# Patient Record
Sex: FEMALE | Race: BLACK OR AFRICAN AMERICAN | Employment: OTHER | URBAN - METROPOLITAN AREA
[De-identification: names, ages, dates, MRNs, and addresses within clinical notes are randomized per-mention and may not be internally consistent; named-entity substitution may affect disease eponyms.]

---

## 2019-03-12 ENCOUNTER — OFFICE VISIT (OUTPATIENT)
Dept: ENDOCRINOLOGY | Age: 79
End: 2019-03-12

## 2019-03-12 VITALS
TEMPERATURE: 96.5 F | HEART RATE: 61 BPM | HEIGHT: 64 IN | SYSTOLIC BLOOD PRESSURE: 143 MMHG | RESPIRATION RATE: 18 BRPM | OXYGEN SATURATION: 98 % | DIASTOLIC BLOOD PRESSURE: 60 MMHG | WEIGHT: 165.7 LBS | BODY MASS INDEX: 28.29 KG/M2

## 2019-03-12 DIAGNOSIS — K76.0 FATTY LIVER: ICD-10-CM

## 2019-03-12 DIAGNOSIS — I10 ESSENTIAL HYPERTENSION: ICD-10-CM

## 2019-03-12 DIAGNOSIS — G62.9 NEUROPATHY: ICD-10-CM

## 2019-03-12 DIAGNOSIS — E11.65 UNCONTROLLED TYPE 2 DIABETES MELLITUS WITH HYPERGLYCEMIA (HCC): Primary | ICD-10-CM

## 2019-03-12 RX ORDER — GLIPIZIDE 5 MG/1
TABLET ORAL
COMMUNITY
Start: 2019-01-20 | End: 2019-03-12 | Stop reason: ALTCHOICE

## 2019-03-12 RX ORDER — CHOLECALCIFEROL TAB 125 MCG (5000 UNIT) 125 MCG
TAB ORAL DAILY
COMMUNITY

## 2019-03-12 RX ORDER — RANITIDINE 150 MG/1
150 TABLET, FILM COATED ORAL 2 TIMES DAILY
COMMUNITY
End: 2020-06-22

## 2019-03-12 RX ORDER — ATENOLOL 50 MG/1
TABLET ORAL
COMMUNITY
Start: 2019-01-28

## 2019-03-12 RX ORDER — ATORVASTATIN CALCIUM 40 MG/1
TABLET, FILM COATED ORAL DAILY
COMMUNITY
End: 2019-12-18 | Stop reason: DRUGHIGH

## 2019-03-12 RX ORDER — SITAGLIPTIN 100 MG/1
TABLET, FILM COATED ORAL
COMMUNITY
Start: 2018-12-10 | End: 2019-03-12 | Stop reason: SDUPTHER

## 2019-03-12 RX ORDER — CLONAZEPAM 0.5 MG/1
TABLET ORAL
COMMUNITY
Start: 2019-02-27

## 2019-03-12 RX ORDER — AMLODIPINE BESYLATE 5 MG/1
TABLET ORAL
COMMUNITY
Start: 2018-12-10

## 2019-03-12 RX ORDER — ICOSAPENT ETHYL 1000 MG/1
CAPSULE ORAL 2 TIMES DAILY WITH MEALS
COMMUNITY

## 2019-03-12 RX ORDER — ESOMEPRAZOLE MAGNESIUM 40 MG/1
CAPSULE, DELAYED RELEASE ORAL
COMMUNITY
Start: 2018-12-22

## 2019-03-12 RX ORDER — GABAPENTIN 300 MG/1
CAPSULE ORAL
COMMUNITY
Start: 2019-01-20 | End: 2020-06-04

## 2019-03-12 NOTE — PATIENT INSTRUCTIONS
Do not skip meals  Do not eat in between meals    Reduce carbs- pasta, rice, potatoes, bread   Do not drink juices or sodas  Donot eat peanut butter     Do not eat sugar free cookies and cakes   Do not eat peaches, oranges, grapes, fruit medleys and  Raisins       -------------------------------------------------------------------------------------------------------------------------------------------    STOP   Glipizide or glucotrol       Stay on januvia 100 mg a day       ----------------------------------------------------------------------------------------------------    [employer]

## 2019-03-12 NOTE — PROGRESS NOTES
1. Have you been to the ER, urgent care clinic since your last visit? Hospitalized since your last visit? No    2. Have you seen or consulted any other health care providers outside of the [address] [place] [address] since your last visit? Include any pap smears or colon screening. No  Chief Complaint   Patient presents with    New Patient     patient here for [id number]     Learning assessment complete  Abuse Screening Questionnaire 3/12/2019   Do you ever feel afraid of your partner? N   Are you in a relationship with someone who physically or mentally threatens you? N   Is it safe for you to go home?  Y     Wt Readings from Last 3 Encounters:   03/12/19 165 lb 11.2 oz (75.2 kg)     Temp Readings from Last 3 Encounters:   03/12/19 96.5 °F (35.8 °C) (Oral)     BP Readings from Last 3 Encounters:   03/12/19 143/60     Pulse Readings from Last 3 Encounters:   03/12/19 61     A1c=5.8% done on 3/5/19

## 2019-03-12 NOTE — PROGRESS NOTES
HISTORY OF PRESENT ILLNESS  Lucille Thompson is a 66 y.o. female. Patient here for initial visit of Type 2 diabetes mellitus . Referred : by dr. Razia Argueta     H/o diabetes for 10 years     Current A1C is 5.8 % and symptoms/problems include {Symptoms; diabetes:61091}     Current diabetic medications include januvia and glipizide   She could nto tolerate metformin    . Current monitoring regimen: {diabetes monitorin}  Home blood sugar records: {diabetes glucometry results:83668}  Any episodes of hypoglycemia? {yes***/no:45840}    Weight trend: {trend:87934}  Prior visit with dietician: {yes***/no:71417}  Current diet: {diet habits:77517}  Current exercise: {exercise types:36026}    Known diabetic complications: {diabetes complications:1215}  Cardiovascular risk factors: {cv risk factors:510}    Eye exam current (within one year): {yes/no/unknown:74}  ROSENDA: {yes/no/unknown:74}     History reviewed. No pertinent past medical history. History reviewed. No pertinent surgical history. Current Outpatient Medications:  amLODIPine (NORVASC) 5 mg tablet,   atenolol (TENORMIN) 50 mg tablet,   esomeprazole (NEXIUM) 40 mg capsule,   clonazePAM (KLONOPIN) 0.5 mg tablet,   gabapentin (NEURONTIN) 300 mg capsule,   glipiZIDE (GLUCOTROL) 5 mg tablet,   JANUVIA 100 mg tablet,   aspirin (ASPIR-81 PO), Take  by mouth. atorvastatin (LIPITOR) 40 mg tablet, Take  by mouth daily. cholecalciferol, VITAMIN D3, (VITAMIN D3) 5,000 unit tab tablet, Take  by mouth daily. raNITIdine (ZANTAC) 150 mg tablet, Take 150 mg by mouth two (2) times a day. multivit-min/iron/folic/lutein (CENTRUM SILVER WOMEN PO), Take  by mouth. icosapent ethyl (VASCEPA) 1 gram capsule, Take  by mouth two (2) times daily (with meals). No current facility-administered medications for this visit.            ROS    Physical Exam    ASSESSMENT and PLAN  {ASSESSMENT/PLAN:14417}

## 2019-03-18 NOTE — PROGRESS NOTES
HISTORY OF PRESENT ILLNESS  Roxy Fenton is a 66 y.o. female. HPI  Patient here for initial visit of Type 2 diabetes mellitus . Referred : by self/pcp    H/o diabetes for several  years     Current A1C is 5.8 % and symptoms/problems include none     Current diabetic medications include GLIPIZIDE 5 MG ONCE A DAY and januvia     Current monitoring regimen: none  Home blood sugar records: trend: stable  Any episodes of hypoglycemia? yes -     Weight trend: stable  Prior visit with dietician: no  Current diet: \"healthy\" diet  in general  Current exercise: no regular exercise    Known diabetic complications: peripheral neuropathy  Cardiovascular risk factors: dyslipidemia, diabetes mellitus, obesity, hypertension, stress, post-menopausal    Eye exam current (within one year): yes  ROSENDA: no     History reviewed. No pertinent past medical history. History reviewed. No pertinent surgical history. Current Outpatient Medications   Medication Sig    amLODIPine (NORVASC) 5 mg tablet     atenolol (TENORMIN) 50 mg tablet     esomeprazole (NEXIUM) 40 mg capsule     clonazePAM (KLONOPIN) 0.5 mg tablet     gabapentin (NEURONTIN) 300 mg capsule     aspirin (ASPIR-81 PO) Take  by mouth.  atorvastatin (LIPITOR) 40 mg tablet Take  by mouth daily.  cholecalciferol, VITAMIN D3, (VITAMIN D3) 5,000 unit tab tablet Take  by mouth daily.  raNITIdine (ZANTAC) 150 mg tablet Take 150 mg by mouth two (2) times a day.  multivit-min/iron/folic/lutein (CENTRUM SILVER WOMEN PO) Take  by mouth.  icosapent ethyl (VASCEPA) 1 gram capsule Take  by mouth two (2) times daily (with meals).  SITagliptin (JANUVIA) 100 mg tablet Take one tab daily.  glucose blood VI test strips (FREESTYLE TEST) strip Use to check blood sugars twice daily. Dx. Code E11.65     No current facility-administered medications for this visit. Review of Systems   Constitutional: Negative. HENT: Negative. Eyes: Negative. Respiratory: Negative. Cardiovascular: Negative. Gastrointestinal: Negative. Genitourinary: Negative. Musculoskeletal: Negative. Skin: Negative. Neurological: Positive for tingling. Endo/Heme/Allergies: Negative. Psychiatric/Behavioral: Negative. Physical Exam   Constitutional: She is oriented to person, place, and time. She appears well-developed and well-nourished. HENT:   Head: Normocephalic. Eyes: Conjunctivae and EOM are normal. Pupils are equal, round, and reactive to light. Neck: Normal range of motion. Neck supple. Cardiovascular: Normal rate and regular rhythm. Pulmonary/Chest: Effort normal and breath sounds normal.   Abdominal: Soft. Bowel sounds are normal.   Musculoskeletal: Normal range of motion. Neurological: She is alert and oriented to person, place, and time. She has normal reflexes. Skin: Skin is warm and dry. Psychiatric: She has a normal mood and affect. Diabetic foot exam:     Left Foot:   Visual Exam: normal    Pulse DP: 2+ (normal)   Filament test: normal sensation    Vibratory sensation: normal      Right Foot:   Visual Exam: normal    Pulse DP: 2+ (normal)   Filament test: normal sensation    Vibratory sensation: normal    ASSESSMENT and PLAN      1. Type 2 DM uncontRolled : A1C IS 5.8 %     COULD NOT Review the glucose log   After good discussion with pt, felt she does nto need glipizide at all and stopped it   Will do any medication addition based on her log at next visit     I felt she is unsafe on glipizide    She will stay on januvia     Patient is advised to check blood sugars 1-2 times daily by rotation method.   reviewed medications and side effects in detail  lab results and schedule of future lab studies reviewed with patient    specific diabetic recommendations: diabetic diet discussed in detail, written exchange diet given, low cholesterol diet, weight control and daily exercise discussed, home glucose monitoring emphasized, glucose meter dispensed to patient and all medications, side effects and compliance discussed carefully    2. Hypoglycemia :  Educated on treating the hypoglycemia. Stopped glipizide    3. HTN : continue current care. Patient is educated about importance of compliance with anti-hypertensives especially ARB/ACEI    4. Dyslipidemia : continue current meds. Patient is educated about benefits and adverse effects of statins and explained how benefits outweigh risk. 5. use of aspirin to prevent MI and TIA's discussed      6.  PERIPHERAL NEUROPATHY : ON GABAPENTIN         > 50 % of time is spent on counseling   Patient voiced understanding her plan of care

## 2019-06-06 ENCOUNTER — HOSPITAL ENCOUNTER (OUTPATIENT)
Dept: LAB | Age: 79
Discharge: HOME | End: 2019-06-06
Payer: MEDICARE

## 2019-06-06 PROCEDURE — 80053 COMPREHEN METABOLIC PANEL: CPT

## 2019-06-06 PROCEDURE — 83036 HEMOGLOBIN GLYCOSYLATED A1C: CPT

## 2019-06-06 PROCEDURE — 82043 UR ALBUMIN QUANTITATIVE: CPT

## 2019-06-06 PROCEDURE — 36415 COLL VENOUS BLD VENIPUNCTURE: CPT

## 2019-06-06 PROCEDURE — 80061 LIPID PANEL: CPT

## 2019-06-13 ENCOUNTER — OFFICE VISIT (OUTPATIENT)
Dept: ENDOCRINOLOGY | Age: 79
End: 2019-06-13

## 2019-06-13 VITALS
HEIGHT: 64 IN | RESPIRATION RATE: 18 BRPM | OXYGEN SATURATION: 98 % | WEIGHT: 158.1 LBS | BODY MASS INDEX: 26.99 KG/M2 | DIASTOLIC BLOOD PRESSURE: 73 MMHG | TEMPERATURE: 96.8 F | HEART RATE: 62 BPM | SYSTOLIC BLOOD PRESSURE: 134 MMHG

## 2019-06-13 DIAGNOSIS — E11.65 UNCONTROLLED TYPE 2 DIABETES MELLITUS WITH HYPERGLYCEMIA (HCC): Primary | ICD-10-CM

## 2019-06-13 DIAGNOSIS — G62.9 NEUROPATHY: ICD-10-CM

## 2019-06-13 DIAGNOSIS — I10 ESSENTIAL HYPERTENSION: ICD-10-CM

## 2019-06-13 DIAGNOSIS — E78.2 MIXED HYPERLIPIDEMIA: ICD-10-CM

## 2019-06-13 RX ORDER — INSULIN PUMP SYRINGE, 3 ML
EACH MISCELLANEOUS
Qty: 1 KIT | Refills: 0 | Status: SHIPPED | OUTPATIENT
Start: 2019-06-13

## 2019-06-13 RX ORDER — NITROFURANTOIN 25; 75 MG/1; MG/1
CAPSULE ORAL
COMMUNITY
Start: 2019-06-11 | End: 2020-06-04

## 2019-06-13 NOTE — PATIENT INSTRUCTIONS
Do not skip meals Do not eat in between meals Reduce carbs- pasta, rice, potatoes, bread Do not drink juices or sodas Do not eat peanut butter Do not eat sugar free cookies and cakes Do not eat peaches, oranges, grapes, fruit medleys and  Raisins  
 
 
------------------------------------------------------------------------------------------------------------------------------------------- 
 
STOP   Glipizide or glucotrol Stay on januvia 100 mg a day

## 2019-06-13 NOTE — PROGRESS NOTES
HISTORY OF PRESENT ILLNESS  Kostas Shields is a 66 y.o. female. HPI  Patient here for  First f/u after  initial visit of Type 2 diabetes mellitus  From march 2019    Hospitalized for high BP   She has multiple UTIS -seeing urologist   And most recent cult is negative     Pt is on macrobid as a precaution          Old history  :    Referred : by self/pcp    H/o diabetes for several  years     Current A1C is 5.8 % and symptoms/problems include none     Current diabetic medications include GLIPIZIDE 5 MG ONCE A DAY and januvia     Current monitoring regimen: none  Home blood sugar records: trend: stable  Any episodes of hypoglycemia? yes -     Weight trend: stable  Prior visit with dietician: no  Current diet: \"healthy\" diet  in general  Current exercise: no regular exercise    Known diabetic complications: peripheral neuropathy  Cardiovascular risk factors: dyslipidemia, diabetes mellitus, obesity, hypertension, stress, post-menopausal    Eye exam current (within one year): yes  ROSENDA: no     History reviewed. No pertinent past medical history. History reviewed. No pertinent surgical history. Current Outpatient Medications   Medication Sig    nitrofurantoin, macrocrystal-monohydrate, (MACROBID) 100 mg capsule     amLODIPine (NORVASC) 5 mg tablet     atenolol (TENORMIN) 50 mg tablet     esomeprazole (NEXIUM) 40 mg capsule     clonazePAM (KLONOPIN) 0.5 mg tablet     gabapentin (NEURONTIN) 300 mg capsule     aspirin (ASPIR-81 PO) Take  by mouth.  atorvastatin (LIPITOR) 40 mg tablet Take  by mouth daily.  cholecalciferol, VITAMIN D3, (VITAMIN D3) 5,000 unit tab tablet Take  by mouth daily.  raNITIdine (ZANTAC) 150 mg tablet Take 150 mg by mouth two (2) times a day.  multivit-min/iron/folic/lutein (CENTRUM SILVER WOMEN PO) Take  by mouth.  icosapent ethyl (VASCEPA) 1 gram capsule Take  by mouth two (2) times daily (with meals).  SITagliptin (JANUVIA) 100 mg tablet Take one tab daily.     glucose blood VI test strips (FREESTYLE TEST) strip Use to check blood sugars twice daily. Dx. Code E11.65 (Patient not taking: Reported on 6/13/2019)     No current facility-administered medications for this visit. Review of Systems   Constitutional: Negative. HENT: Negative. Eyes: Negative. Respiratory: Negative. Cardiovascular: Negative. Gastrointestinal: Negative. Genitourinary: Negative. Musculoskeletal: Negative. Skin: Negative. Neurological: Positive for tingling. Endo/Heme/Allergies: Negative. Psychiatric/Behavioral: Negative. Physical Exam   Constitutional: She is oriented to person, place, and time. She appears well-developed and well-nourished. HENT:   Head: Normocephalic. Eyes: Pupils are equal, round, and reactive to light. Conjunctivae and EOM are normal.   Neck: Normal range of motion. Neck supple. Cardiovascular: Normal rate and regular rhythm. Pulmonary/Chest: Effort normal and breath sounds normal.   Abdominal: Soft. Bowel sounds are normal.   Musculoskeletal: Normal range of motion. Neurological: She is alert and oriented to person, place, and time. She has normal reflexes. Skin: Skin is warm and dry. Psychiatric: She has a normal mood and affect. Diabetic foot exam:     Left Foot:   Visual Exam: normal    Pulse DP: 2+ (normal)   Filament test: normal sensation    Vibratory sensation: normal      Right Foot:   Visual Exam: normal    Pulse DP: 2+ (normal)   Filament test: normal sensation    Vibratory sensation: normal      ASSESSMENT and PLAN    1.  Type 2 DM uncontRolled : A1C IS  6.3 %     From     June 2019      compared to  5.8 %   From  March 2019       COULD NOT Review the glucose log again    After good discussion with pt, felt she does nto need glipizide at all and stopped it   Will do any medication addition based on her log at next visit   I felt she is unsafe on glipizide also and STOPPED IT     She will stay on januvia Patient is advised to check blood sugars 1-2 times daily by rotation method. reviewed medications and side effects in detail  lab results and schedule of future lab studies reviewed with patient      2. Hypoglycemia :  Educated on treating the hypoglycemia. Stopped glipizide    3. HTN : continue current care. Patient is educated about importance of compliance with anti-hypertensives especially ARB/ACEI    4. Dyslipidemia : continue current meds. Patient is educated about benefits and adverse effects of statins and explained how benefits outweigh risk. 5. use of aspirin to prevent MI and TIA's discussed      6.  PERIPHERAL NEUROPATHY : ON GABAPENTIN         > 50 % of time is spent on counseling   Patient voiced understanding her plan of care

## 2019-06-13 NOTE — PROGRESS NOTES
Venus Mckeon is a 66 y.o. female    Patient does not have her glucose meter, patient has been using a old glucose meter. Patient would like a prescription for glucose meter   Chief Complaint   Patient presents with    Diabetes       1. Have you been to the ER, urgent care clinic since your last visit? Hospitalized since your last visit? No  M  2. Have you seen or consulted any other health care providers outside of the [address] [place] [address] since your last visit? Include any pap smears or colon screening. No      Visit Vitals  /73 (BP 1 Location: Right arm, BP Patient Position: Sitting)   Pulse 62   Temp 96.8 °F (36 °C) (Oral)   Resp 18   Ht 5' 4\" (1.626 m)   Wt 158 lb 1.6 oz (71.7 kg)   SpO2 98%   BMI 27.14 kg/m²       Wt Readings from Last 3 Encounters:   06/13/19 158 lb 1.6 oz (71.7 kg)   03/12/19 165 lb 11.2 oz (75.2 kg)     Temp Readings from Last 3 Encounters:   06/13/19 96.8 °F (36 °C) (Oral)   03/12/19 96.5 °F (35.8 °C) (Oral)     BP Readings from Last 3 Encounters:   06/13/19 134/73   03/12/19 143/60     Pulse Readings from Last 3 Encounters:   06/13/19 62   03/12/19 61           Health Maintenance Due   Topic Date Due    EYE EXAM RETINAL OR DILATED  10/20/1950    DTaP/Tdap/Td series (1 - Tdap) 10/20/1961    Shingrix Vaccine Age 50> (1 of 2) 10/20/1990    GLAUCOMA SCREENING Q2Y  10/20/2005    Bone Densitometry (Dexa) Screening  10/20/2005    Pneumococcal 65+ years (1 of 2 - PCV13) 10/20/2005    MEDICARE YEARLY EXAM  03/14/2018         Medication Reconciliation completed, changes noted.   Please  Update medication list.

## 2019-06-13 NOTE — LETTER
6/16/19 Patient: Rachele Mcgarry YOB: 1940 Date of Visit: 6/13/2019 Veronica Major MD 
[address] [address] VIA Facsimile: [phone] Dear Veronica Major MD, Thank you for referring Ms. Rachele Mcgarry to [address] [place] [address] for evaluation. My notes for this consultation are attached. If you have questions, please do not hesitate to call me. I look forward to following your patient along with you. Sincerely, Seymour Nava MD

## 2019-09-12 ENCOUNTER — HOSPITAL ENCOUNTER (OUTPATIENT)
Dept: LAB | Age: 79
Discharge: HOME | End: 2019-09-12
Payer: MEDICARE

## 2019-09-12 DIAGNOSIS — E11.65 UNCONTROLLED TYPE 2 DIABETES MELLITUS WITH HYPERGLYCEMIA (HCC): ICD-10-CM

## 2019-09-12 DIAGNOSIS — E78.2 MIXED HYPERLIPIDEMIA: ICD-10-CM

## 2019-09-12 PROCEDURE — 80061 LIPID PANEL: CPT

## 2019-09-12 PROCEDURE — 36415 COLL VENOUS BLD VENIPUNCTURE: CPT

## 2019-09-13 LAB
CHOLEST SERPL-MCNC: 199 MG/DL (ref 100–199)
HDLC SERPL-MCNC: 40 MG/DL
INTERPRETATION, 910389: NORMAL
LDLC SERPL CALC-MCNC: 117 MG/DL (ref 0–99)
TRIGL SERPL-MCNC: 209 MG/DL (ref 0–149)
VLDLC SERPL CALC-MCNC: 42 MG/DL (ref 5–40)

## 2019-12-09 ENCOUNTER — LAB ONLY (OUTPATIENT)
Dept: ENDOCRINOLOGY | Age: 79
End: 2019-12-09

## 2019-12-09 ENCOUNTER — HOSPITAL ENCOUNTER (OUTPATIENT)
Dept: LAB | Age: 79
Discharge: HOME | End: 2019-12-09

## 2019-12-09 DIAGNOSIS — E11.65 UNCONTROLLED TYPE 2 DIABETES MELLITUS WITH HYPERGLYCEMIA (HCC): Primary | ICD-10-CM

## 2019-12-09 DIAGNOSIS — E11.65 UNCONTROLLED TYPE 2 DIABETES MELLITUS WITH HYPERGLYCEMIA (HCC): ICD-10-CM

## 2019-12-09 DIAGNOSIS — E78.2 MIXED HYPERLIPIDEMIA: ICD-10-CM

## 2019-12-09 DIAGNOSIS — I10 ESSENTIAL HYPERTENSION: ICD-10-CM

## 2019-12-09 LAB
CREAT UR-MCNC: 96 MG/DL
EST. AVERAGE GLUCOSE BLD GHB EST-MCNC: 128 MG/DL
HBA1C MFR BLD: 6.1 % (ref 4–5.6)
MICROALBUMIN UR-MCNC: 6.59 MG/DL
MICROALBUMIN/CREAT UR-RTO: 69 MG/G (ref 0–30)

## 2019-12-10 LAB
ALBUMIN SERPL-MCNC: 4.4 G/DL (ref 3.5–5)
ALBUMIN/GLOB SERPL: 1.3 {RATIO} (ref 1.1–2.2)
ALP SERPL-CCNC: 55 U/L (ref 45–117)
ALT SERPL-CCNC: 31 U/L (ref 12–78)
ANION GAP SERPL CALC-SCNC: 6 MMOL/L (ref 5–15)
AST SERPL-CCNC: 17 U/L (ref 15–37)
BILIRUB SERPL-MCNC: 1.3 MG/DL (ref 0.2–1)
BUN SERPL-MCNC: 14 MG/DL (ref 6–20)
BUN/CREAT SERPL: 17 (ref 12–20)
CALCIUM SERPL-MCNC: 10 MG/DL (ref 8.5–10.1)
CHLORIDE SERPL-SCNC: 103 MMOL/L (ref 97–108)
CHOLEST SERPL-MCNC: 241 MG/DL
CO2 SERPL-SCNC: 30 MMOL/L (ref 21–32)
CREAT SERPL-MCNC: 0.84 MG/DL (ref 0.55–1.02)
GLOBULIN SER CALC-MCNC: 3.3 G/DL (ref 2–4)
GLUCOSE SERPL-MCNC: 119 MG/DL (ref 65–100)
HDLC SERPL-MCNC: 51 MG/DL
HDLC SERPL: 4.7 {RATIO} (ref 0–5)
LDLC SERPL CALC-MCNC: 156 MG/DL (ref 0–100)
LIPID PROFILE,FLP: ABNORMAL
POTASSIUM SERPL-SCNC: 4.6 MMOL/L (ref 3.5–5.1)
PROT SERPL-MCNC: 7.7 G/DL (ref 6.4–8.2)
SODIUM SERPL-SCNC: 139 MMOL/L (ref 136–145)
TRIGL SERPL-MCNC: 170 MG/DL (ref ?–150)
VLDLC SERPL CALC-MCNC: 34 MG/DL

## 2019-12-18 ENCOUNTER — OFFICE VISIT (OUTPATIENT)
Dept: ENDOCRINOLOGY | Age: 79
End: 2019-12-18

## 2019-12-18 VITALS
OXYGEN SATURATION: 95 % | BODY MASS INDEX: 27.02 KG/M2 | WEIGHT: 158.3 LBS | TEMPERATURE: 97.2 F | RESPIRATION RATE: 18 BRPM | HEART RATE: 64 BPM | HEIGHT: 64 IN | SYSTOLIC BLOOD PRESSURE: 137 MMHG | DIASTOLIC BLOOD PRESSURE: 78 MMHG

## 2019-12-18 DIAGNOSIS — E11.65 UNCONTROLLED TYPE 2 DIABETES MELLITUS WITH HYPERGLYCEMIA (HCC): Primary | ICD-10-CM

## 2019-12-18 DIAGNOSIS — G62.9 NEUROPATHY: ICD-10-CM

## 2019-12-18 DIAGNOSIS — E78.2 MIXED HYPERLIPIDEMIA: ICD-10-CM

## 2019-12-18 DIAGNOSIS — I10 ESSENTIAL HYPERTENSION: ICD-10-CM

## 2019-12-18 RX ORDER — ATORVASTATIN CALCIUM 20 MG/1
20 TABLET, FILM COATED ORAL
Qty: 90 TAB | Refills: 4 | Status: SHIPPED | OUTPATIENT
Start: 2019-12-18

## 2019-12-18 NOTE — LETTER
12/21/19 Patient: Corey Bush YOB: 1940 Date of Visit: 12/18/2019 General Leta MD 
[address] [facility] [address] [place] [address] VIA Facsimile: [phone] Dear General Leta MD, Thank you for referring Ms. Corey Buhs to [address] [place] [address] for evaluation. My notes for this consultation are attached. If you have questions, please do not hesitate to call me. I look forward to following your patient along with you. Sincerely, Iker Clinton MD

## 2019-12-18 NOTE — PATIENT INSTRUCTIONS
Do not skip meals Do not eat in between meals Reduce carbs- pasta, rice, potatoes, bread Do not drink juices or sodas Do not eat peanut butter Do not eat sugar free cookies and cakes Do not eat peaches, oranges, grapes, fruit medleys and  Raisins  
 
 
------------------------------------------------------------------------------------------------------------------------------------------- Stay on januvia 100 mg a day RESUME LIPITOR 20 MG AT NIGHT  
 
 
 
------------------------------------------------------------------------------------------------------------------------------

## 2019-12-18 NOTE — PROGRESS NOTES
HISTORY OF PRESENT ILLNESS  Richar Johnson is a 78 y.o. female. HPI  Patient here for  6 month  f/u after  visit of Type 2 diabetes mellitus  From June 2019    No meter   Feels fine   denies any low sugars       Old history  :     Hospitalized for high BP   She has multiple UTIS -seeing urologist   And most recent cult is negative     Pt is on macrobid as a precaution          Old history  :    Referred : by self/pcp    H/o diabetes for several  years     Current A1C is 5.8 % and symptoms/problems include none     Current diabetic medications include GLIPIZIDE 5 MG ONCE A DAY and januvia     Current monitoring regimen: none  Home blood sugar records: trend: stable  Any episodes of hypoglycemia? yes -     Weight trend: stable  Prior visit with dietician: no  Current diet: \"healthy\" diet  in general  Current exercise: no regular exercise    Known diabetic complications: peripheral neuropathy  Cardiovascular risk factors: dyslipidemia, diabetes mellitus, obesity, hypertension, stress, post-menopausal    Eye exam current (within one year): yes  ROSENDA: no     History reviewed. No pertinent past medical history. History reviewed. No pertinent surgical history. Current Outpatient Medications   Medication Sig    glucose blood VI test strips (FREESTYLE LITE STRIPS) strip Use to check blood sugars twice daily. Dx. Code E11.65    Blood-Glucose Meter (FREESTYLE LITE METER) monitoring kit Use to check blood sugars twice daily. Dx. Code E11.65    amLODIPine (NORVASC) 5 mg tablet     atenolol (TENORMIN) 50 mg tablet     esomeprazole (NEXIUM) 40 mg capsule     clonazePAM (KLONOPIN) 0.5 mg tablet     gabapentin (NEURONTIN) 300 mg capsule     aspirin (ASPIR-81 PO) Take  by mouth.  atorvastatin (LIPITOR) 40 mg tablet Take  by mouth daily.  cholecalciferol, VITAMIN D3, (VITAMIN D3) 5,000 unit tab tablet Take  by mouth daily.  raNITIdine (ZANTAC) 150 mg tablet Take 150 mg by mouth two (2) times a day.     multivit-min/iron/folic/lutein (CENTRUM SILVER WOMEN PO) Take  by mouth.  icosapent ethyl (VASCEPA) 1 gram capsule Take  by mouth two (2) times daily (with meals).  SITagliptin (JANUVIA) 100 mg tablet Take one tab daily.  nitrofurantoin, macrocrystal-monohydrate, (MACROBID) 100 mg capsule     glucose blood VI test strips (FREESTYLE TEST) strip Use to check blood sugars twice daily. Dx. Code E11.65 (Patient not taking: Reported on 6/13/2019)     No current facility-administered medications for this visit. Review of Systems   Constitutional: Negative. HENT: Negative. Eyes: Negative. Respiratory: Negative. Cardiovascular: Negative. Gastrointestinal: Negative. Genitourinary: Negative. Musculoskeletal: Negative. Skin: Negative. Neurological: Positive for tingling. Endo/Heme/Allergies: Negative. Psychiatric/Behavioral: Negative. Physical Exam   Constitutional: She is oriented to person, place, and time. She appears well-developed and well-nourished. HENT:   Head: Normocephalic. Eyes: Pupils are equal, round, and reactive to light. Conjunctivae and EOM are normal.   Neck: Normal range of motion. Neck supple. Cardiovascular: Normal rate and regular rhythm. Pulmonary/Chest: Effort normal and breath sounds normal.   Abdominal: Soft. Bowel sounds are normal.   Musculoskeletal: Normal range of motion. Neurological: She is alert and oriented to person, place, and time. She has normal reflexes. Skin: Skin is warm and dry. Psychiatric: She has a normal mood and affect.      Diabetic foot exam:     Left Foot:   Visual Exam: normal    Pulse DP: 2+ (normal)   Filament test: normal sensation    Vibratory sensation: normal      Right Foot:   Visual Exam: normal    Pulse DP: 2+ (normal)   Filament test: normal sensation    Vibratory sensation: normal        Lab Results   Component Value Date/Time    Hemoglobin A1c 6.1 (H) 12/09/2019 09:42 AM    Hemoglobin A1c 6.3 (H) 06/06/2019 08:52 AM    Hemoglobin A1c, External 5.8 03/01/2019    Glucose 119 (H) 12/09/2019 09:42 AM    Microalbumin/Creat ratio (mg/g creat) 69 (H) 12/09/2019 09:40 AM    Microalbumin,urine random 6.59 12/09/2019 09:40 AM    LDL, calculated 156 (H) 12/09/2019 09:42 AM    Creatinine 0.84 12/09/2019 09:42 AM      Lab Results   Component Value Date/Time    Cholesterol, total 241 (H) 12/09/2019 09:42 AM    HDL Cholesterol 51 12/09/2019 09:42 AM    LDL, calculated 156 (H) 12/09/2019 09:42 AM    LDL-C, External 102 03/01/2019    Triglyceride 170 (H) 12/09/2019 09:42 AM    CHOL/HDL Ratio 4.7 12/09/2019 09:42 AM     Lab Results   Component Value Date/Time    ALT (SGPT) 31 12/09/2019 09:42 AM    AST (SGOT) 17 12/09/2019 09:42 AM    Alk. phosphatase 55 12/09/2019 09:42 AM    Bilirubin, total 1.3 (H) 12/09/2019 09:42 AM    Albumin 4.4 12/09/2019 09:42 AM    Protein, total 7.7 12/09/2019 09:42 AM     Lab Results   Component Value Date/Time    GFR est non-AA >60 12/09/2019 09:42 AM    GFR est AA >60 12/09/2019 09:42 AM    Creatinine 0.84 12/09/2019 09:42 AM    BUN 14 12/09/2019 09:42 AM    Sodium 139 12/09/2019 09:42 AM    Potassium 4.6 12/09/2019 09:42 AM    Chloride 103 12/09/2019 09:42 AM    CO2 30 12/09/2019 09:42 AM           ASSESSMENT and PLAN    1. Type 2 DM uncontRolled : A1C IS   6.1 %      From dec 2019   Compared to    6.3 %     From     June 2019      compared to  5.8 %   From  March 2019       COULD NOT Review the glucose log again - third time     After good discussion with pt, felt she does nto need glipizide at all and stopped it   Will do any medication addition based on her log at next visit   I felt she is unsafe on glipizide also and STOPPED IT     She will stay on januvia ONLY     Patient is advised to check blood sugars 1-2 times daily by rotation method. reviewed medications and side effects in detail  lab results and schedule of future lab studies reviewed with patient      2.  Hypoglycemia : Educated on treating the hypoglycemia. Stopped glipizide    3. HTN : CONTROLLED, continue ATENOLOL, AND AMLODIPINE  5 MG  Patient is educated about importance of compliance with anti-hypertensives especially ARB/ACEI    4. Dyslipidemia : LDL IS OVER 150, NOT TAKING  STATIN   . Patient is educated about benefits and adverse effects of statins and explained how benefits outweigh risk. 5. use of aspirin to prevent MI and TIA's discussed      6.  PERIPHERAL NEUROPATHY : ON GABAPENTIN         > 50 % of time is spent on counseling   Patient voiced understanding her plan of care

## 2019-12-18 NOTE — PROGRESS NOTES
1. Have you been to the ER, urgent care clinic since your last visit? No  Hospitalized since your last visit? No    2. Have you seen or consulted any other health care providers outside of the [address] [place] [address] since your last visit? Include any pap smears or colon screening. No    Wt Readings from Last 3 Encounters:   12/18/19 158 lb 4.8 oz (71.8 kg)   06/13/19 158 lb 1.6 oz (71.7 kg)   03/12/19 165 lb 11.2 oz (75.2 kg)     Temp Readings from Last 3 Encounters:   12/18/19 97.2 °F (36.2 °C) (Oral)   06/13/19 96.8 °F (36 °C) (Oral)   03/12/19 96.5 °F (35.8 °C) (Oral)     BP Readings from Last 3 Encounters:   12/18/19 137/78   06/13/19 134/73   03/12/19 143/60     Pulse Readings from Last 3 Encounters:   12/18/19 64   06/13/19 62   03/12/19 61     Lab Results   Component Value Date/Time    Hemoglobin A1c 6.1 (H) 12/09/2019 09:42 AM    Hemoglobin A1c, External 5.8 03/01/2019     Patient does not have meter today.

## 2020-06-04 ENCOUNTER — VIRTUAL VISIT (OUTPATIENT)
Dept: NEUROLOGY | Age: 80
End: 2020-06-04

## 2020-06-04 ENCOUNTER — TELEPHONE (OUTPATIENT)
Dept: NEUROLOGY | Age: 80
End: 2020-06-04

## 2020-06-04 DIAGNOSIS — R51.9 NEW ONSET OF HEADACHES AFTER AGE 50: Primary | ICD-10-CM
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
## 2020-06-04 RX ORDER — GABAPENTIN 300 MG/1
300 CAPSULE ORAL 2 TIMES DAILY
Qty: 180 CAP | Refills: 1 | Status: SHIPPED | OUTPATIENT
Start: 2020-06-04 | End: 2021-08-31 | Stop reason: ALTCHOICE

## 2020-06-04 NOTE — TELEPHONE ENCOUNTER
Called and left a message for patient. Her Gabapentin rx printed out because she goes to [facility]. She has to have the written rx to take with her to [facility]. Also, Dr. Barbara Emmanuel wants her to schedule some appts.

## 2020-06-04 NOTE — PROGRESS NOTES
Chief Complaint   Patient presents with    New Patient     virtual visit--new patient c/o shocking pain in head. Also has noticed hand writing is different--no tremors.

## 2020-06-04 NOTE — PROGRESS NOTES
[address] [facility] [address]   [place] [address] [address]    XY   [phone] Fax        Ciara Beasley is a 78 y.o. female who was seen by synchronous (real-time) audio-video technology on 6/4/2020. Consent:  She and/or her healthcare decision maker is aware that this patient-initiated Telehealth encounter is a billable service, with coverage as determined by her insurance carrier. She is aware that she may receive a bill and has provided verbal consent to proceed: Yes    I was in the office while conducting this encounter. Subjective:   Ciara Beasley was seen for New Patient (virtual visit--new patient c/o shocking pain in head. Also has noticed hand writing is different--no tremors.)    26-year-old lady who is seen today accompanied by her daughter for evaluation of what they call head pain. The patient was diagnosed in 2010 with occipital neuralgia. She saw a neurologist but had no treatment for such. She never had any injections. She never had any medication. In any regard it would come and go over the years. In April 2019 she took a fall striking her head. Since that time she has had increasing headaches. Notes that she gets sharp shooting pain in the mid occipital region right sided. It will radiate forward towards her face at times. She has no focal deficits with it. Not lost consciousness. No loss of vision. Her daughter says that she just does not seem right since that trauma. She has been to her dentist to make sure that her teeth were fine and that she did not fracture her jaw when she fell. She has had a head CT initially after that fall but no follow-up studies. No palpitations. No chest pain. No fever. No chills. No focal weakness. No difficulty with speech language or swallowing.   When she gets these pains they are unremitting her daughter says that she just sequestered herself in her bedroom and sleeps or tries to sleep and tries to rest.  No other associated symptom. Her daughter again just says that she does not seem to be herself since the fall. She is more subdued. Her personality is a bit different. No past medical history on file. 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 Prior to Admission medications    Medication Sig Start Date End Date Taking? Authorizing Provider   SITagliptin (JANUVIA) 100 mg tablet Take one tab daily. 12/18/19  Yes Marvin Guajardo MD   atorvastatin (LIPITOR) 20 mg tablet Take 1 Tab by mouth nightly. 12/18/19  Yes Marvin Guajardo MD   glucose blood VI test strips (FREESTYLE LITE STRIPS) strip Use to check blood sugars twice daily. Dx. Code E11.65 6/13/19  Yes Marvin Guajardo MD   Blood-Glucose Meter (FREESTYLE LITE METER) monitoring kit Use to check blood sugars twice daily. Dx. Code E11.65 6/13/19  Yes Marvin Guajardo MD   amLODIPine (NORVASC) 5 mg tablet  12/10/18  Yes Provider, Historical   atenolol (TENORMIN) 50 mg tablet  1/28/19  Yes Provider, Historical   esomeprazole (NEXIUM) 40 mg capsule  12/22/18  Yes Provider, Historical   clonazePAM (KLONOPIN) 0.5 mg tablet  2/27/19  Yes Provider, Historical   gabapentin (NEURONTIN) 300 mg capsule  1/20/19  Yes Provider, Historical   aspirin (ASPIR-81 PO) Take  by mouth. Yes Provider, Historical   cholecalciferol, VITAMIN D3, (VITAMIN D3) 5,000 unit tab tablet Take  by mouth daily. Yes Provider, Historical   raNITIdine (ZANTAC) 150 mg tablet Take 150 mg by mouth two (2) times a day. Yes Provider, Historical   multivit-min/iron/folic/lutein (CENTRUM SILVER WOMEN PO) Take  by mouth. Yes Provider, Historical   icosapent ethyl (VASCEPA) 1 gram capsule Take  by mouth two (2) times daily (with meals). Yes Provider, Historical   glucose blood VI test strips (FREESTYLE TEST) strip Use to check blood sugars twice daily.  Dx. Code E11.65 3/12/19  Yes Marvin Guajardo MD   nitrofurantoin, macrocrystal-monohydrate, (MACROBID) 100 mg capsule  6/11/19   Provider, Historical     Allergies   Allergen Reactions    Darvon [Propoxyphene] Anaphylaxis    Nitrofurantoin Palpitations    Pantoprazole Hives and Shortness of Breath    Septocaine [Articaine-Epinephrine] Palpitations     Social History     Socioeconomic History    Marital status: UNKNOWN     Spouse name: Not on file    Number of children: Not on file    Years of education: Not on file    Highest education level: Not on file   Occupational History    Not on file   Social Needs    Financial resource strain: Not on file    Food insecurity     Worry: Not on file     Inability: Not on file    Transportation needs     Medical: Not on file     Non-medical: Not on file   Tobacco Use    Smoking status: Never Smoker    Smokeless tobacco: Never Used   Substance and Sexual Activity    Alcohol use: Not on file    Drug use: Not on file    Sexual activity: Not on file   Lifestyle    Physical activity     Days per week: Not on file     Minutes per session: Not on file    Stress: Not on file   Relationships    Social connections     Talks on phone: Not on file     Gets together: Not on file     Attends Sabianist service: Not on file     Active member of club or organization: Not on file     Attends meetings of clubs or organizations: Not on file     Relationship status: Not on file    Intimate partner violence     Fear of current or ex partner: Not on file     Emotionally abused: Not on file     Physically abused: Not on file     Forced sexual activity: Not on file   Other Topics Concern    Not on file   Social History Narrative    Not on file   No family history on file. ROS  Pertinent positives and negatives as noted with remainder of comprehensive review negative    Examination  She is a very pleasant lady. She is engaging and interactive with appropriate dress grooming and affect. No scleral icterus.   Oropharynx clear moist.  She has normal speech and language. Normal cognition in terms of discussing her medical history current events following commands. She has full versions. No nystagmus. Symmetric face. Tongue and palate are midline. Shoulder shrug symmetrical.  No pronation. No drift. No abnormal movement. She moves all extremities symmetrically. No ataxia. Due to this being a TeleHealth evaluation, many elements of the physical examination are unable to be assessed. Impression/Plan  68-year-old lady with history of right-sided occipital neuralgia now with increasing head pain status post a fall right about a year ago and concerns as noted. Given that we will proceed with an MRI of the brain as well as carotid Doppler and EEG. We will increase her Neurontin to a dose of 300 mg twice daily. The [address] will not accept controlled substance prescription so I will have the office call that in. I will see her back after her studies have been completed    Pursuant to the emergency declaration under the [facility], [facility]5 waiver authority and the [employer] Act, this Virtual  Visit was conducted, with patient's consent, to reduce the patient's risk of exposure to COVID-19 and provide continuity of care for an established patient. Services were provided through a video synchronous discussion virtually to substitute for in-person clinic visit. Janell Heimlich, MD     This document was created with the assistance of voice recognition software and therefore unintended syntax errors may be present despite editing. For any questions please contact me directly. This note will not be viewable in [address]

## 2020-06-08 ENCOUNTER — TELEPHONE (OUTPATIENT)
Dept: NEUROLOGY | Age: 80
End: 2020-06-08

## 2020-06-08 NOTE — TELEPHONE ENCOUNTER
appt scheduled for eeg on 6/16/20 at 3pm and f/u with Dr. Azul Briseno on 6/22/20 at 9:40 am.  Pt will p/u gabapentin rx that was printed when she comes for EEG

## 2020-06-08 NOTE — TELEPHONE ENCOUNTER
----- Message from Orquidea Askew sent at 6/8/2020  8:03 AM EDT -----  Regarding: /Telephone  General Message/Vendor Calls    Caller's first and last name: Rocio Del Rio      Reason for call:MRI and prescribed medication       Callback required yes/no and why:yes      Best contact number(s):[phone]      Details to clarify the request:Pt daughter called requesting a call back from April in regards to scheduling MRI and in reference to prescribed medication pt cannot get filled .       Orquidea Askew

## 2020-06-10 ENCOUNTER — HOSPITAL ENCOUNTER (OUTPATIENT)
Dept: MRI IMAGING | Age: 80
Discharge: HOME | End: 2020-06-10
Attending: PSYCHIATRY & NEUROLOGY
Payer: MEDICARE

## 2020-06-10 ENCOUNTER — HOSPITAL ENCOUNTER (OUTPATIENT)
Dept: VASCULAR SURGERY | Age: 80
Discharge: HOME | End: 2020-06-10
Attending: PSYCHIATRY & NEUROLOGY
Payer: MEDICARE
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
## 2020-06-10 DIAGNOSIS — R51.9 NEW ONSET OF HEADACHES AFTER AGE 50: ICD-10-CM

## 2020-06-10 PROCEDURE — 93880 EXTRACRANIAL BILAT STUDY: CPT

## 2020-06-10 PROCEDURE — 70551 MRI BRAIN STEM W/O DYE: CPT

## 2020-06-11 LAB
LEFT CCA DIST DIAS: 18.6 CM/S
LEFT CCA DIST SYS: 85.6 CM/S
LEFT CCA PROX DIAS: 10.9 CM/S
LEFT CCA PROX SYS: 56 CM/S
LEFT ECA DIAS: 9.53 CM/S
LEFT ECA SYS: 111.3 CM/S
LEFT ICA DIST DIAS: 28.3 CM/S
LEFT ICA DIST SYS: 90.5 CM/S
LEFT ICA MID DIAS: 16.5 CM/S
LEFT ICA MID SYS: 64.5 CM/S
LEFT ICA PROX DIAS: 16 CM/S
LEFT ICA PROX SYS: 75.8 CM/S
LEFT ICA/CCA SYS: 1.06
LEFT SUBCLAVIAN DIAS: 0 CM/S
LEFT SUBCLAVIAN SYS: 158.5 CM/S
LEFT VERTEBRAL DIAS: 12.03 CM/S
LEFT VERTEBRAL SYS: 39.1 CM/S
RIGHT CCA DIST DIAS: 17.6 CM/S
RIGHT CCA DIST SYS: 86.2 CM/S
RIGHT CCA PROX DIAS: 14.1 CM/S
RIGHT CCA PROX SYS: 80.1 CM/S
RIGHT ECA DIAS: 8.54 CM/S
RIGHT ECA SYS: 83.6 CM/S
RIGHT ICA DIST DIAS: 24.4 CM/S
RIGHT ICA DIST SYS: 85.2 CM/S
RIGHT ICA MID DIAS: 18 CM/S
RIGHT ICA MID SYS: 67.1 CM/S
RIGHT ICA PROX DIAS: 11.5 CM/S
RIGHT ICA PROX SYS: 68.4 CM/S
RIGHT ICA/CCA SYS: 1
RIGHT SUBCLAVIAN DIAS: 0 CM/S
RIGHT SUBCLAVIAN SYS: 112.2 CM/S
RIGHT VERTEBRAL DIAS: 10.38 CM/S
RIGHT VERTEBRAL SYS: 43.5 CM/S

## 2020-06-16 ENCOUNTER — OFFICE VISIT (OUTPATIENT)
Dept: NEUROLOGY | Age: 80
End: 2020-06-16

## 2020-06-16 VITALS — TEMPERATURE: 95.9 F

## 2020-06-16 DIAGNOSIS — R51.9 NEW ONSET OF HEADACHES AFTER AGE 50: Primary | ICD-10-CM

## 2020-06-16 NOTE — PROCEDURES
EEG:      Date:  06/16/20    Requesting Physician:  Lilia Mehta. MD Julito    An EEG is requested in this 68-year-old with head trauma to evaluate for epileptiform abnormality. Medications:  Medications are listed as Neurontin, Klonopin, Tenormin, Norvasc, Lipitor and Januvia. This tracing is obtained during the awake state. During wakefulness there are intermittent runs of posteriorly-dominant and symmetric low-to-medium amplitude 10 cycle per second activities which attenuate with eye opening. Lower-voltage faster-frequency activities are seen symmetrically over the anterior head regions. Hyperventilation is not performed secondary to COVID-19 precautions. Intermittent photic stimulation little alters the tracing. Sleep is not attained. Interpretation:   This EEG recorded during the awake state is normal.

## 2020-06-22 ENCOUNTER — OFFICE VISIT (OUTPATIENT)
Dept: NEUROLOGY | Age: 80
End: 2020-06-22

## 2020-06-22 VITALS
DIASTOLIC BLOOD PRESSURE: 76 MMHG | HEIGHT: 64 IN | WEIGHT: 158.29 LBS | BODY MASS INDEX: 27.02 KG/M2 | RESPIRATION RATE: 18 BRPM | OXYGEN SATURATION: 96 % | TEMPERATURE: 97.3 F | HEART RATE: 74 BPM | SYSTOLIC BLOOD PRESSURE: 140 MMHG

## 2020-06-22 DIAGNOSIS — M54.81 OCCIPITAL NEURALGIA OF RIGHT SIDE: Primary | ICD-10-CM

## 2020-06-22 NOTE — LETTER
6/22/20 Patient: Josselyn Felix YOB: 1940 Date of Visit: 6/22/2020 Garciela Batrh MD 
[address] [address] VIA Facsimile: [phone] Dear Graciela Barth MD, Thank you for referring Ms. Josselyn Felix to [facility] for evaluation. My notes for this consultation are attached. If you have questions, please do not hesitate to call me. I look forward to following your patient along with you. Sincerely, Janet Fu MD

## 2020-06-22 NOTE — PROGRESS NOTES
Chief Complaint   Patient presents with    Results     mri, dop, eeg    Headache     still has HAs but \"not so much\"  approx 10 per mo

## 2020-06-22 NOTE — PROGRESS NOTES
[facility] and [address] at [facility] at [address] [facility], [address] [facility] [address] [facility], [address] [place] [address]   [phone]              Chief Complaint   Patient presents with    Results     mri, dop, eeg    Headache     still has HAs but \"not so much\"  approx 10 per mo     Current Outpatient Medications   Medication Sig Dispense Refill    gabapentin (NEURONTIN) 300 mg capsule Take 1 Cap by mouth two (2) times a day. Max Daily Amount: 600 mg. 180 Cap 1    SITagliptin (JANUVIA) 100 mg tablet Take one tab daily. 90 Tab 4    atorvastatin (LIPITOR) 20 mg tablet Take 1 Tab by mouth nightly. 90 Tab 4    glucose blood VI test strips (FREESTYLE LITE STRIPS) strip Use to check blood sugars twice daily. Dx. Code E11.65 100 Strip 11    Blood-Glucose Meter (FREESTYLE LITE METER) monitoring kit Use to check blood sugars twice daily. Dx. Code E11.65 1 Kit 0    amLODIPine (NORVASC) 5 mg tablet       atenolol (TENORMIN) 50 mg tablet       esomeprazole (NEXIUM) 40 mg capsule       clonazePAM (KLONOPIN) 0.5 mg tablet       aspirin (ASPIR-81 PO) Take  by mouth.  cholecalciferol, VITAMIN D3, (VITAMIN D3) 5,000 unit tab tablet Take  by mouth daily.  multivit-min/iron/folic/lutein (CENTRUM SILVER WOMEN PO) Take  by mouth.  icosapent ethyl (VASCEPA) 1 gram capsule Take  by mouth two (2) times daily (with meals).  glucose blood VI test strips (FREESTYLE TEST) strip Use to check blood sugars twice daily.  Dx. Code E11.65 100 Strip 11      Allergies   Allergen Reactions    Darvon [Propoxyphene] Anaphylaxis    Nitrofurantoin Palpitations    Pantoprazole Hives and Shortness of Breath    Septocaine [Articaine-Epinephrine] Palpitations     Social History     Tobacco Use    Smoking status: Never Smoker    Smokeless tobacco: Never Used   Substance Use Topics    Alcohol use: Not on file  Drug use: Not on file     19-year-old woman returns today for follow-up after her initial consultation June 4. This was an initial evaluation for head pain. She was previously diagnosed with occipital neuralgia. She had increasing head pain after a fall. We went ahead and sent her for an MRI carotid Doppler and EEG to exclude any ominous underlying pathology that could be responsible for secondary headache syndrome. We increased her dose of Neurontin from 300 mg once daily up to 300 mg twice daily. Her MRI scan is personally reviewed this morning and shows age-related white matter changes but nothing significant. Carotid Doppler with no significant stenosis. Her EEG was normal.    Today she is accompanied by her daughter. She reports has had some difficulty taking the Neurontin twice daily because it makes her drowsy morning. Otherwise no tolerability issues. She has been taking the 300 mg daily for her peripheral neuropathy secondary to diabetes. She is controlling her sugar. She still gets the head pain probably about 10 days out of the month. She had a little last night and the day before yesterday she had some intense pain. No focal deficits. No palpitations. No fever. No chills. No nausea. No vomiting. No shortness of breath. Review of systems  Pertinent positives and negatives as noted with remainder of comprehensive review negative      Examination  Visit Vitals  /76 (BP 1 Location: Left arm, BP Patient Position: Sitting)   Pulse 74   Temp 97.3 °F (36.3 °C)   Resp 18   Ht 5' 4\" (1.626 m)   Wt 71.8 kg (158 lb 4.6 oz)   SpO2 96%   BMI 27.17 kg/m²     She is a very pleasant lady. She is awake alert oriented and conversant. Speech and language are normal.  She has no scleral icterus. Dress grooming and affect are appropriate. Pulses are symmetrical.  No edema. She has reactive pupils and her disc margins are flat bilaterally. Face symmetric with symmetric facial sensation. Tongue and palate are midline. Shoulder shrug symmetrical.  She is tender to palpation over the occipital notch on the right and not on the left. She has no pronation drift or abnormal movement. Age-related paratonia is present. She gives full resistance in the upper and lower extremities in all muscle groups to direct testing. Reflexes are symmetrical upper and lower extremities bilaterally and no pathologic reflexes are elicited. No ataxia. No sensory deficit. Gait steady. Impression/Plan  66-year-old lady with right-sided occipital neuralgia which has increased after a fall and we discussed this is not uncommon. Some tolerability by taking Neurontin 300 mg twice daily so will use 600 mg nightly. Discussed that today at length. Discussed Neurontin as well as its indications and potential side effects. Discussed that can take 6-8 weeks to get a maximum effect. We therefore will see her back in about 6-8 weeks after she has been taking it consistently. Good questions today regarding occipital neuralgia, etiology, medications used to treat, etc.    Cody Kumar MD    Total time: 25 min   Counseling / coordination time: 15 min   > 50% counseling / coordination?: Yes re: as documented above      This note was created using voice recognition software. Despite editing, there may be syntax errors. This note will not be viewable in [address]

## 2021-08-31 ENCOUNTER — OFFICE VISIT (OUTPATIENT)
Dept: NEUROLOGY | Age: 81
End: 2021-08-31
Payer: MEDICARE

## 2021-08-31 VITALS
SYSTOLIC BLOOD PRESSURE: 106 MMHG | HEART RATE: 73 BPM | DIASTOLIC BLOOD PRESSURE: 76 MMHG | WEIGHT: 157.2 LBS | HEIGHT: 64 IN | BODY MASS INDEX: 26.84 KG/M2 | OXYGEN SATURATION: 95 %

## 2021-08-31 DIAGNOSIS — G62.9 NEUROPATHY: ICD-10-CM

## 2021-08-31 DIAGNOSIS — M54.81 OCCIPITAL NEURALGIA OF RIGHT SIDE: Primary | ICD-10-CM

## 2021-08-31 PROCEDURE — G8432 DEP SCR NOT DOC, RNG: HCPCS | Performed by: NURSE PRACTITIONER

## 2021-08-31 PROCEDURE — 1090F PRES/ABSN URINE INCON ASSESS: CPT | Performed by: NURSE PRACTITIONER

## 2021-08-31 PROCEDURE — G8419 CALC BMI OUT NRM PARAM NOF/U: HCPCS | Performed by: NURSE PRACTITIONER

## 2021-08-31 PROCEDURE — 99214 OFFICE O/P EST MOD 30 MIN: CPT | Performed by: NURSE PRACTITIONER

## 2021-08-31 PROCEDURE — G8536 NO DOC ELDER MAL SCRN: HCPCS | Performed by: NURSE PRACTITIONER

## 2021-08-31 PROCEDURE — 1101F PT FALLS ASSESS-DOCD LE1/YR: CPT | Performed by: NURSE PRACTITIONER

## 2021-08-31 PROCEDURE — G8428 CUR MEDS NOT DOCUMENT: HCPCS | Performed by: NURSE PRACTITIONER

## 2021-08-31 PROCEDURE — G8400 PT W/DXA NO RESULTS DOC: HCPCS | Performed by: NURSE PRACTITIONER

## 2021-08-31 RX ORDER — ROSUVASTATIN CALCIUM 5 MG/1
TABLET, COATED ORAL
COMMUNITY
End: 2021-10-13 | Stop reason: ALTCHOICE

## 2021-08-31 RX ORDER — MELOXICAM 15 MG/1
15 TABLET ORAL DAILY
COMMUNITY

## 2021-08-31 RX ORDER — ACETAMINOPHEN AND CODEINE PHOSPHATE 300; 15 MG/1; MG/1
1 TABLET ORAL
COMMUNITY

## 2021-08-31 RX ORDER — CYCLOBENZAPRINE HCL 10 MG
10 TABLET ORAL
COMMUNITY

## 2021-08-31 RX ORDER — GABAPENTIN 400 MG/1
CAPSULE ORAL
Qty: 30 CAPSULE | Refills: 5 | Status: SHIPPED | OUTPATIENT
Start: 2021-08-31 | End: 2021-10-13

## 2021-08-31 RX ORDER — SUCRALFATE 1 G/1
1 TABLET ORAL
COMMUNITY

## 2021-08-31 RX ORDER — NITROFURANTOIN (MACROCRYSTALS) 100 MG/1
CAPSULE ORAL
COMMUNITY

## 2021-09-01 NOTE — PROGRESS NOTES
Susy oYrk is a [de-identified] y.o. female who presents with the following  Chief Complaint   Patient presents with    Follow-up     neuropathy & occipital neuralgia       HPI     FU neuralgia and right lower extremity numbness, tingling, weakness. Neuralgia is stable on Gapapentin 300 mg nightly. She has noticed the last few months her right leg is getting worse  Was diagnosed with neuropathy when living in [place] with what seems like was an EMG   Over 5 years ago   Has not had one since  States she only had it in her right leg  She feels needles, ants, creepy crawlers in the leg and up to about the knee per report. She is on Gabapentin for her neuralgia but not noticed it has helped the nerve damage. Has been on 300 mg nightly. BID makes her too tired. She does have trouble with balance, coordination and the right leg and motor functioning. No recent falls. Does have lumbar spine pain    Allergies   Allergen Reactions    Darvon [Propoxyphene] Anaphylaxis    Nitrofurantoin Palpitations    Pantoprazole Hives and Shortness of Breath    Septocaine [Articaine-Epinephrine] Palpitations       Current Outpatient Medications   Medication Sig    acetaminophen-codeine (TYLENOL #2) 300-15 mg tab Take 1 Tablet by mouth every four (4) hours as needed.  meloxicam (MOBIC) 15 mg tablet Take 15 mg by mouth daily.  mirabegron ER (Myrbetriq) 50 mg ER tablet Take 25 mg by mouth daily.  nitrofurantoin (MACRODANTIN) 100 mg capsule Take  by mouth nightly.  rosuvastatin (CRESTOR) 5 mg tablet Take  by mouth nightly.  sucralfate (CARAFATE) 1 gram tablet Take 1 g by mouth four (4) times daily as needed.  cyclobenzaprine (FLEXERIL) 10 mg tablet Take 10 mg by mouth two (2) times daily as needed for Muscle Spasm(s).  gabapentin (NEURONTIN) 400 mg capsule Take 1 capsule by mouth nightly.  SITagliptin (JANUVIA) 100 mg tablet Take one tab daily.     atorvastatin (LIPITOR) 20 mg tablet Take 1 Tab by mouth nightly. (Patient taking differently: Take 40 mg by mouth nightly.)    glucose blood VI test strips (FREESTYLE LITE STRIPS) strip Use to check blood sugars twice daily. Dx. Code E11.65    Blood-Glucose Meter (FREESTYLE LITE METER) monitoring kit Use to check blood sugars twice daily. Dx. Code E11.65    amLODIPine (NORVASC) 5 mg tablet     atenolol (TENORMIN) 50 mg tablet     esomeprazole (NEXIUM) 40 mg capsule     clonazePAM (KLONOPIN) 0.5 mg tablet     aspirin (ASPIR-81 PO) Take  by mouth.  cholecalciferol, VITAMIN D3, (VITAMIN D3) 5,000 unit tab tablet Take  by mouth daily.  multivit-min/iron/folic/lutein (CENTRUM SILVER WOMEN PO) Take  by mouth.  icosapent ethyl (VASCEPA) 1 gram capsule Take  by mouth two (2) times daily (with meals).  glucose blood VI test strips (FREESTYLE TEST) strip Use to check blood sugars twice daily. Dx. Code E11.65     No current facility-administered medications for this visit. Social History     Tobacco Use   Smoking Status Never Smoker   Smokeless Tobacco Never Used       No past medical history on file. No past surgical history on file. No family history on file. Social History     Socioeconomic History    Marital status:      Spouse name: Not on file    Number of children: Not on file    Years of education: Not on file    Highest education level: Not on file   Tobacco Use    Smoking status: Never Smoker    Smokeless tobacco: Never Used     Social Determinants of Health     Financial Resource Strain:     Difficulty of Paying Living Expenses:    Food Insecurity:     Worried About Running Out of Food in the Last Year:     [address] in the Last Year:    Transportation Needs:     Lack of Transportation (Medical):      Lack of Transportation (Non-Medical):    Physical Activity:     Days of Exercise per Week:     Minutes of Exercise per Session:    Stress:     Feeling of Stress :    Social Connections:     Frequency of Communication with Friends and Family:     Frequency of Social Gatherings with Friends and Family:     Attends Spiritism Services:     Active Member of Clubs or Organizations:     Attends Club or Organization Meetings:     Marital Status:        Review of Systems   Eyes: Negative for blurred vision. Respiratory: Negative for shortness of breath and wheezing. Musculoskeletal: Positive for back pain. Neurological: Positive for tingling, weakness and headaches. Remainder of comprehensive review is negative. Physical Exam :    Visit Vitals  /76   Pulse 73   Ht 5' 4\" (1.626 m)   Wt 71.3 kg (157 lb 3.2 oz)   SpO2 95%   BMI 26.98 kg/m²       General: Well defined, nourished, and groomed individual in no acute distress.    Neck: Supple, nontender, no bruits, no pain with resistance to active range of motion.    Heart: Regular rate and rhythm, no murmurs, rub, or gallop. Normal S1S2. Lungs: Clear to auscultation bilaterally with equal chest expansion, no cough, no wheeze  Musculoskeletal: Extremities revealed no edema and had full range of motion of joints.    Psych: Good mood and bright affect    NEUROLOGICAL EXAMINATION:    Mental Status: Alert and oriented to person, place, and time    Cranial Nerves:    II, III, IV, VI: Visual acuity grossly intact. Visual fields are normal.    Pupils are equal, round, and reactive to light and accommodation.    Extra-ocular movements are full and fluid. Fundoscopic exam was benign, no ptosis or nystagmus.    V-XII: Hearing is grossly intact. Facial features are symmetric, with normal sensation and strength. The palate rises symmetrically and the tongue protrudes midline. Sternocleidomastoids 5/5. Motor Examination: Normal tone, bulk, and strength, 5/5 muscle strength throughout. Coordination: Finger to nose was normal. No resting or intention tremor    Gait and Station: Steady while walking. Normal arm swing. No pronator drift.  No muscle wasting or fasiculations noted. Reflexes: DTRs 1+ throughout. Neurosensory Exam:  Stocking glove sensory loss to temperature, vibration, and pinprick to the thigh right LE        Results for orders placed or performed during the hospital encounter of 06/10/20   DUPLEX CAROTID BILATERAL   Result Value Ref Range    Right subclavian sys 112.2 cm/s    RIGHT SUBCLAVIAN ARTERY D 0.00 cm/s    Right cca dist sys 86.2 cm/s    Right CCA dist [place] 17.6 cm/s    Right CCA prox sys 80.1 cm/s    Right CCA prox [place] 14.1 cm/s    Right eca sys 83.6 cm/s    RIGHT EXTERNAL CAROTID ARTERY D 8.54 cm/s    Right ICA dist sys 85.2 cm/s    Right ICA dist [place] 24.4 cm/s    Right ICA mid sys 67.1 cm/s    Right ICA mid [place] 18.0 cm/s    Right ICA prox sys 68.4 cm/s    Right ICA prox [place] 11.5 cm/s    Right vertebral sys 43.5 cm/s    RIGHT VERTEBRAL ARTERY D 10.38 cm/s    Right ICA/CCA sys 1.0     Left subclavian sys 158.5 cm/s    LEFT SUBCLAVIAN ARTERY D 0.00 cm/s    Left CCA dist sys 85.6 cm/s    Left CCA dist [place] 18.6 cm/s    Left CCA prox sys 56.0 cm/s    Left CCA prox [place] 10.9 cm/s    Left ECA sys 111.3 cm/s    LEFT EXTERNAL CAROTID ARTERY D 9.53 cm/s    Left ICA dist sys 90.5 cm/s    Left ICA dist [place] 28.3 cm/s    Left ICA mid sys 64.5 cm/s    Left ICA mid [place] 16.5 cm/s    Left ICA prox sys 75.8 cm/s    Left ICA prox [place] 16.0 cm/s    Left vertebral sys 39.1 cm/s    LEFT VERTEBRAL ARTERY D 12.03 cm/s    Left ICA/CCA sys 1.06        Orders Placed This Encounter    EMG LIMITED     Standing Status:   Future     Standing Expiration Date:   2/28/2022     Order Specific Question:   Reason for Exam:     Answer:   RIGHT LEG    acetaminophen-codeine (TYLENOL #2) 300-15 mg tab     Sig: Take 1 Tablet by mouth every four (4) hours as needed.  meloxicam (MOBIC) 15 mg tablet     Sig: Take 15 mg by mouth daily.  mirabegron ER (Myrbetriq) 50 mg ER tablet     Sig: Take 25 mg by mouth daily.     nitrofurantoin (MACRODANTIN) 100 mg capsule     Sig: Take  by mouth nightly.  rosuvastatin (CRESTOR) 5 mg tablet     Sig: Take  by mouth nightly.  sucralfate (CARAFATE) 1 gram tablet     Sig: Take 1 g by mouth four (4) times daily as needed.  cyclobenzaprine (FLEXERIL) 10 mg tablet     Sig: Take 10 mg by mouth two (2) times daily as needed for Muscle Spasm(s).  gabapentin (NEURONTIN) 400 mg capsule     Sig: Take 1 capsule by mouth nightly. Dispense:  30 Capsule     Refill:  5       1. Occipital neuralgia of right side    2. Neuropathy          Increase Gabapentin to 400 mg nightly for neuralgia and neuropathy. EMG RIGHT leg to evaluate further for radiculopathy vs other   States she has no symptoms in the left leg   Fu after.            This note will not be viewable in [employer]

## 2021-10-01 ENCOUNTER — OFFICE VISIT (OUTPATIENT)
Dept: NEUROLOGY | Age: 81
End: 2021-10-01
Payer: MEDICARE

## 2021-10-01 DIAGNOSIS — R20.2 PARESTHESIA: Primary | ICD-10-CM

## 2021-10-01 PROCEDURE — 95910 NRV CNDJ TEST 7-8 STUDIES: CPT | Performed by: PSYCHIATRY & NEUROLOGY

## 2021-10-01 PROCEDURE — 95886 MUSC TEST DONE W/N TEST COMP: CPT | Performed by: PSYCHIATRY & NEUROLOGY

## 2021-10-01 NOTE — PROCEDURES
EMG/ NCS Report  DR[facility]ILITATION  - [facility]  [facility]  [facility], [address] [place], [address]   Ph: [phone]   FAX: [phone]  Test Date:  2019      Test Date:  10/1/2021    Patient: Dinesh Beasley : 1940 Physician: Gregg Quintero MD   Sex: Female Height: ' \" Ref Javier Kruger   ID#: [id number] Weight:  lbs. Technician: Jacquelyn Izaguirre     Patient History / Exam:  Patient comes in with chronic lower back pain and burning pain of the R LE for 10 yrs.    (+) diabetes. Patient is coming for neuropathy evaluation. EMG & NCV Findings:  Evaluation of the right Fibular motor nerve showed normal distal onset latency (3.7 ms), reduced amplitude (0.9 mV), normal conduction velocity (B Fib-Ankle, 42 m/s), and normal conduction velocity (Poplt-B Fib, 59 m/s). The right Fibular TA motor nerve showed normal distal onset latency (3.5 ms), normal amplitude (5.9 mV), and normal conduction velocity (Poplit-Fib Head, 59 m/s). The right tibial motor nerve showed normal distal onset latency (4.5 ms), normal amplitude (6.9 mV), and normal conduction velocity (Knee-Ankle, 42 m/s). The left Sup Fibular sensory nerve showed normal distal peak latency (2.4 ms), normal amplitude (14.4 µV), and normal conduction velocity (Lower leg-Lat ankle, 56 m/s). The right Sup Fibular sensory nerve showed no response (Lower leg). The left sural sensory and the right sural sensory nerves showed normal distal peak latency (L3.9, R4.2 ms) and normal amplitude (L6.8, R5.0 µV). F Wave studies indicate that the right tibial F wave has prolonged latency (59.93 ms). Needle evaluation of the right extensor digitorum brevis and the right anterior tibialis muscles showed diminished recruitment, Incr Duration, and slightly increased polyphasic potentials.   The right posterior tibialis and the right gluteus medius muscles showed diminished recruitment, Incr Duration, and increased motor unit amplitude. All remaining muscles (as indicated in the following table) showed no evidence of electrical instability. Impression:    ABNORMAL    Extensive electrodiagnostic examination of the right lower extremity, with additional nerve conduction study of the left lower extremity shows the followin) Reduced right fibular motor amplitude response recording EDB muscle  2. Chronic, without active, motor axon loss changes in muscles innervated by right L5 root/segment    Above findings are suggestive of an underlying chronic right L5 motor radiculopathy. Recommend radiological correlation. Of note, right superficial fibular sensory response is absent compared to the left, which can reportedly be associated in severe cases of right L5 radiculopathy owing to a more distal compression.           Braeden Mishra MD  Diplomate, American Board of Psychiatry and Neurology  Diplomate, Neuromuscular Medicine  Diplomate, American Board of Electrodiagnostic Medicine  Director, [address] [place] [address] Accredited Laboratory with Exemplary Status          Nerve Conduction Studies  Anti Sensory Summary Table     Stim Site NR Peak (ms) Norm Peak (ms) P-T Amp (µV) Norm P-T Amp Site1 Site2 Dist (cm)   Left Sup Fibular Anti Sensory (Lat ankle)  29.4°C   Lower leg    2.4 <4.6 14.4 >4 Lower leg Lat ankle 10.0   Right Sup Fibular Anti Sensory (Lat ankle)  30.6°C   Lower leg NR  <4.6  >4 Lower leg Lat ankle 10.0   Left Sural Anti Sensory (Lat Mall)  29.4°C   Calf    3.9 <4.5 6.8 >4.0 Calf Lat Mall 14.0   Right Sural Anti Sensory (Lat Mall)  30.6°C   Calf    4.2 <4.5 5.0 >4.0 Calf Lat Mall 14.0     Motor Summary Table     Stim Site NR Onset (ms) Norm Onset (ms) O-P Amp (mV) Norm O-P Amp Amp (Prev) (%) Site1 Site2 Dist (cm) Orlando (m/s) Norm Orlando (m/s)   Right Fibular Motor (Ext Dig Brev)  31.1°C   Ankle    3.7 <6.5 0.9 >1.1 100.0 Ankle Ext Dig Brev 8.0     B Fib    10.6  0.8  88.9 B Fib Ankle 29.0 42 >38   Poplt 12.3  0.8  100.0 Poplt B Fib 10.0 59 >42   Right Fibular TA Motor (Tib Ant)  30.3°C   Fib Head    3.5 <4.5 5.9 >3.0 100.0 Fib Head Tib Ant 10.0     Poplit    5.2  5.6  94.9 Poplit Fib Head 10.0 59 >40   Right Tibial Motor (Abd Arauz Brev)  31.2°C   Ankle    4.5 <6.1 6.9 >1.1 100.0 Ankle Abd Arauz Brev 8.0     Knee    13.1  5.3  76.8 Knee Ankle 36.0 42 >39     F Wave Studies     NR F-Lat (ms) Lat Norm (ms) L-R F-Lat (ms) L-R Lat Norm   Right Tibial (Mrkrs) (Abd Hallucis)  31.2°C      59.93 <56  <5.7     H Reflex Studies     NR H-Lat (ms) L-R H-Lat (ms) L-R Lat Norm   Right Tibial (Gastroc)  31.2°C      40.00  <2.0     EMG     Side Muscle Nerve Root Ins Act Fibs Psw Recrt Duration Amp Poly Comment   Right Ext Dig Brev Dp Br Peron L5, S1 Nml Nml Nml Reduced Incr Nml 1+    Right AbdHallucis MedPlantar S1-2 Nml Nml Nml Nml Nml Nml Nml    Right AntTibialis Dp Br Peron L4-5 Nml Nml Nml Reduced Incr Nml 1+    Right PostTibialis Tibial L5, S1 Nml Nml Nml Reduced Incr Incr Nml    Right MedGastroc Tibial S1-2 Nml Nml Nml Nml Nml Nml Nml    Right VastusLat Femoral L2-4 Nml Nml Nml Nml Nml Nml Nml    Right GluteusMed SupGluteal L4-S1 Nml Nml Nml Reduced Incr Incr Nml    Right Lower Lumb Parasp Rami L5,S1 Nml Nml Nml Nml Nml Nml Nml                Nerve Conduction Studies  Anti Sensory Left/Right Comparison     Stim Site L Lat (ms) R Lat (ms) L-R Lat (ms) L Amp (µV) R Amp (µV) L-R Amp (%) Site1 Site2 L Orlando (m/s) R Orlando (m/s) L-R Orlando (m/s)   Sup Fibular Anti Sensory (Lat ankle)  29.4°C   Lower leg 1.8   14.4   Lower leg Lat ankle 56     Sural Anti Sensory (Lat Mall)  29.4°C   Calf 3.2 3.6 0.4 6.8 5.0 26.5 Calf Lat Mall 44 39 5     Motor Left/Right Comparison     Stim Site L Lat (ms) R Lat (ms) L-R Lat (ms) L Amp (mV) R Amp (mV) L-R Amp (%) Site1 Site2 L Orlando (m/s) R Orlando (m/s) L-R Orlando (m/s)   Fibular Motor (Ext Dig Brev)  31.1°C   Ankle  3.7   0.9  Ankle Ext Dig Brev      B Fib  10.6   0.8  B Fib Ankle  42    Poplt  12.3   0.8  Poplt B Fib  59    Fibular TA Motor (Tib Ant)  30.3°C   Fib Head  3.5   5.9  Fib Head Tib Ant      Poplit  5.2   5.6  Poplit Fib Head  59    Tibial Motor (Abd Arauz Brev)  31.2°C   Ankle  4.5   6.9  Ankle Abd Arauz Brev      Knee  13.1   5.3  Knee Ankle  42          Waveforms:

## 2021-10-05 ENCOUNTER — TELEPHONE (OUTPATIENT)
Dept: NEUROLOGY | Age: 81
End: 2021-10-05

## 2021-10-05 NOTE — TELEPHONE ENCOUNTER
----- Message from Sheree Rios sent at 10/5/2021  9:38 AM EDT -----  Regarding: /telephone  General Message/Vendor Calls    Caller's first and last name: Sherwin Kennedy (pt's daughter)      Reason for call:pt's daughter called to speak with Dr. Salina Reyes required yes/no and why:yes to speak with       Best contact number(s):[phone]        Details to clarify the request:Pt's daughter called to speak with Dr. Dennis Bower, regarding on the pt's condition. Pt's daughter is stating that her mother is very sick, feeling sharp pain on the head of the pt.       Sheree Rios

## 2021-10-06 NOTE — TELEPHONE ENCOUNTER
S/w pt's daughter, she was asking if there was an appt earlier than 10/13 as pt's head pain has been triggered. Notified there is none unfortunately. She will go to ED if pain gets worse.

## 2021-10-13 ENCOUNTER — OFFICE VISIT (OUTPATIENT)
Dept: NEUROLOGY | Age: 81
End: 2021-10-13
Payer: MEDICARE

## 2021-10-13 VITALS
BODY MASS INDEX: 26.8 KG/M2 | WEIGHT: 157 LBS | OXYGEN SATURATION: 98 % | RESPIRATION RATE: 16 BRPM | TEMPERATURE: 97.5 F | HEART RATE: 80 BPM | HEIGHT: 64 IN | SYSTOLIC BLOOD PRESSURE: 126 MMHG | DIASTOLIC BLOOD PRESSURE: 86 MMHG

## 2021-10-13 DIAGNOSIS — M54.41 RIGHT-SIDED LOW BACK PAIN WITH RIGHT-SIDED SCIATICA, UNSPECIFIED CHRONICITY: ICD-10-CM

## 2021-10-13 DIAGNOSIS — R51.9 NEW ONSET OF HEADACHES AFTER AGE 50: Primary | ICD-10-CM

## 2021-10-13 DIAGNOSIS — M54.16 LUMBAR RADICULOPATHY: ICD-10-CM

## 2021-10-13 PROCEDURE — G8510 SCR DEP NEG, NO PLAN REQD: HCPCS | Performed by: PSYCHIATRY & NEUROLOGY

## 2021-10-13 PROCEDURE — G8400 PT W/DXA NO RESULTS DOC: HCPCS | Performed by: PSYCHIATRY & NEUROLOGY

## 2021-10-13 PROCEDURE — 1090F PRES/ABSN URINE INCON ASSESS: CPT | Performed by: PSYCHIATRY & NEUROLOGY

## 2021-10-13 PROCEDURE — 1101F PT FALLS ASSESS-DOCD LE1/YR: CPT | Performed by: PSYCHIATRY & NEUROLOGY

## 2021-10-13 PROCEDURE — G8536 NO DOC ELDER MAL SCRN: HCPCS | Performed by: PSYCHIATRY & NEUROLOGY

## 2021-10-13 PROCEDURE — G8419 CALC BMI OUT NRM PARAM NOF/U: HCPCS | Performed by: PSYCHIATRY & NEUROLOGY

## 2021-10-13 PROCEDURE — G8427 DOCREV CUR MEDS BY ELIG CLIN: HCPCS | Performed by: PSYCHIATRY & NEUROLOGY

## 2021-10-13 PROCEDURE — 99214 OFFICE O/P EST MOD 30 MIN: CPT | Performed by: PSYCHIATRY & NEUROLOGY

## 2021-10-13 RX ORDER — OXCARBAZEPINE 150 MG/1
150 TABLET, FILM COATED ORAL 2 TIMES DAILY
Qty: 60 TABLET | Refills: 3 | Status: SHIPPED | OUTPATIENT
Start: 2021-10-13

## 2021-10-13 NOTE — PROGRESS NOTES
[facility] and [address] at [facility] at [address] [facility], [address] [facility] [address] [facility], [address] [place] [address]   [phone]              No chief complaint on file. Current Outpatient Medications   Medication Sig Dispense Refill    acetaminophen-codeine (TYLENOL #2) 300-15 mg tab Take 1 Tablet by mouth every four (4) hours as needed.  meloxicam (MOBIC) 15 mg tablet Take 15 mg by mouth daily.  mirabegron ER (Myrbetriq) 50 mg ER tablet Take 25 mg by mouth daily.  nitrofurantoin (MACRODANTIN) 100 mg capsule Take  by mouth nightly.  sucralfate (CARAFATE) 1 gram tablet Take 1 g by mouth four (4) times daily as needed.  cyclobenzaprine (FLEXERIL) 10 mg tablet Take 10 mg by mouth two (2) times daily as needed for Muscle Spasm(s).  gabapentin (NEURONTIN) 400 mg capsule Take 1 capsule by mouth nightly. 30 Capsule 5    SITagliptin (JANUVIA) 100 mg tablet Take one tab daily. 90 Tab 4    atorvastatin (LIPITOR) 20 mg tablet Take 1 Tab by mouth nightly. (Patient taking differently: Take 40 mg by mouth nightly.) 90 Tab 4    glucose blood VI test strips (FREESTYLE LITE STRIPS) strip Use to check blood sugars twice daily. Dx. Code E11.65 100 Strip 11    amLODIPine (NORVASC) 5 mg tablet       atenolol (TENORMIN) 50 mg tablet       esomeprazole (NEXIUM) 40 mg capsule       clonazePAM (KLONOPIN) 0.5 mg tablet       aspirin (ASPIR-81 PO) Take  by mouth.  cholecalciferol, VITAMIN D3, (VITAMIN D3) 5,000 unit tab tablet Take  by mouth daily.  multivit-min/iron/folic/lutein (CENTRUM SILVER WOMEN PO) Take  by mouth.  icosapent ethyl (VASCEPA) 1 gram capsule Take  by mouth two (2) times daily (with meals).  glucose blood VI test strips (FREESTYLE TEST) strip Use to check blood sugars twice daily.  Dx. Code E11.65 100 Strip 11    Blood-Glucose Meter (FREESTYLE LITE METER) monitoring kit Use to check blood sugars twice daily. Dx. Code E11.65 1 Kit 0      Allergies   Allergen Reactions    Darvon [Propoxyphene] Anaphylaxis    Nitrofurantoin Palpitations    Pantoprazole Hives and Shortness of Breath    Septocaine [Articaine-Epinephrine] Palpitations     Social History     Tobacco Use    Smoking status: Never Smoker    Smokeless tobacco: Never Used   Substance Use Topics    Alcohol use: Not on file    Drug use: Not on file     20-year-old lady returns today for follow-up. I saw her June 22 for right-sided occipital neuralgia and she was on Neurontin. She then saw our nurse practitioner August 31 and noted that she was having right lower extremity numbness tingling and weakness that was worsening. He increase gabapentin to a dose of 400 mg nightly and sent her for EMG  EMG was done by one of our neuromuscular experts and this demonstrated chronic L5 motor radiculopathy but no evidence of peripheral neuropathy. Some questions regarding the Neurontin dose and it looks like through the medication dispense report she has been using the 300 mg strength. That was filled for 90 days August 10. She is here with her daughter. She had some difficulty with the pharmacy as above. The pharmacist apparently would not fill the 400 mg prescription. She has been using 600 mg of her 300 mg capsules. No change to her pain. She notes the pain in her head is different than what she had for occipital neuralgia. This is right sided more parietal constant intense and unremitting. No focal deficits. Still pain in the right lower extremity radicular nature and she does have low back pain. Examination  Visit Vitals  /86 (BP 1 Location: Left upper arm, BP Patient Position: Sitting, BP Cuff Size: Adult)   Pulse 80   Temp 97.5 °F (36.4 °C)   Resp 16   Ht 5' 4\" (1.626 m)   Wt 71.2 kg (157 lb)   SpO2 98%   BMI 26.95 kg/m²     Awake alert oriented and conversant.   Normal speech and language. Intact cranial nerves. Full motor strength. Reflexes symmetrical.  Tender to palpation about the right parietal cranium    Impression/Plan  New onset headache in an 66-year-old lady  MRI of the brain  Therapeutic trial of Trileptal 150 mg twice daily    Low back pain with associated right leg pain and abnormal EMG  MRI lumbar spine    Follow-up after tests    Rainer Alas MD        This note was created using voice recognition software. Despite editing, there may be syntax errors.

## 2021-10-21 ENCOUNTER — HOSPITAL ENCOUNTER (OUTPATIENT)
Dept: MRI IMAGING | Age: 81
Discharge: HOME | End: 2021-10-21
Attending: PSYCHIATRY & NEUROLOGY
Payer: MEDICARE

## 2021-10-21 DIAGNOSIS — M54.41 RIGHT-SIDED LOW BACK PAIN WITH RIGHT-SIDED SCIATICA, UNSPECIFIED CHRONICITY: ICD-10-CM

## 2021-10-21 DIAGNOSIS — M54.16 LUMBAR RADICULOPATHY: ICD-10-CM

## 2021-10-21 DIAGNOSIS — R51.9 NEW ONSET OF HEADACHES AFTER AGE 50: ICD-10-CM

## 2021-10-21 PROCEDURE — 72148 MRI LUMBAR SPINE W/O DYE: CPT

## 2021-10-21 PROCEDURE — 70551 MRI BRAIN STEM W/O DYE: CPT

## 2021-10-26 ENCOUNTER — TELEPHONE (OUTPATIENT)
Dept: NEUROLOGY | Age: 81
End: 2021-10-26

## 2021-10-26 DIAGNOSIS — M54.41 RIGHT-SIDED LOW BACK PAIN WITH RIGHT-SIDED SCIATICA, UNSPECIFIED CHRONICITY: ICD-10-CM

## 2021-10-26 DIAGNOSIS — M54.16 LUMBAR RADICULOPATHY: Primary | ICD-10-CM

## 2021-10-26 NOTE — TELEPHONE ENCOUNTER
----- Message from Junius Mariano III sent at 10/26/2021 12:25 PM EDT -----  Regarding: /telephone  General Message/Vendor Calls    Caller's first and last name: Self       Reason for call: follow up MRI      Callback required yes/no and why: yes to confirm       Best contact number(s):[phone]      Details to clarify the request: Pt will like speak to Dr Ysabel Bynum About her  MRI results and if possible schedule a appointment       Junius Mariano III

## 2021-10-27 NOTE — TELEPHONE ENCOUNTER
----- Message from Brent Roy sent at 10/27/2021  2:56 PM EDT -----  Regarding: /telephone  General Message/Vendor Calls    Caller's first and last name: Yasmine Carpenter( pt's daughter)      Reason for call:pt's conditions       Callback required yes/no and why: yes to speak with Yemi Guerrero      Best contact number(s): [phone]    Details to clarify the request: Pt's daughter called to speak with Dr. Yemi Laws, regarding on the pt's condition. Pt's daughter is stating that the pt is in an extreme pain.       Brent Roy

## 2021-10-29 NOTE — TELEPHONE ENCOUNTER
Referral placed for Dr. Mandi Etienne and faxed along with notes.   LVM with pt stating this as well

## 2021-11-16 ENCOUNTER — TELEPHONE (OUTPATIENT)
Dept: NEUROLOGY | Age: 81
End: 2021-11-16

## 2021-11-16 NOTE — TELEPHONE ENCOUNTER
Called pt's pcp and LVM to see if pt has followed up with him regarding renal mass. Notes had been sent twice and daughter was notified 10/28/21.   See tele encounter 10/26/21

## 2022-02-24 NOTE — TELEPHONE ENCOUNTER
Sent MRI report on 10/22/21 to pcp then again now. Pt is in a lot of pain, please advise. Ultrasound shows only small left varicocele which we discussed yesterday.    Patient aware of results. Patient requests urology referral at this time.

## 2023-05-04 ENCOUNTER — OFFICE VISIT (OUTPATIENT)
Dept: NEUROLOGY | Age: 83
End: 2023-05-04
Payer: MEDICARE

## 2023-05-04 VITALS
DIASTOLIC BLOOD PRESSURE: 72 MMHG | SYSTOLIC BLOOD PRESSURE: 150 MMHG | HEIGHT: 66 IN | BODY MASS INDEX: 25.23 KG/M2 | RESPIRATION RATE: 20 BRPM | WEIGHT: 157 LBS | HEART RATE: 77 BPM

## 2023-05-04 DIAGNOSIS — M54.81 OCCIPITAL NEURALGIA OF RIGHT SIDE: Primary | ICD-10-CM

## 2023-05-04 DIAGNOSIS — M48.061 SPINAL STENOSIS OF LUMBAR REGION, UNSPECIFIED WHETHER NEUROGENIC CLAUDICATION PRESENT: ICD-10-CM

## 2023-05-04 PROCEDURE — G8417 CALC BMI ABV UP PARAM F/U: HCPCS

## 2023-05-04 PROCEDURE — 1123F ACP DISCUSS/DSCN MKR DOCD: CPT

## 2023-05-04 PROCEDURE — G8400 PT W/DXA NO RESULTS DOC: HCPCS

## 2023-05-04 PROCEDURE — G8432 DEP SCR NOT DOC, RNG: HCPCS

## 2023-05-04 PROCEDURE — 99214 OFFICE O/P EST MOD 30 MIN: CPT

## 2023-05-04 PROCEDURE — G8536 NO DOC ELDER MAL SCRN: HCPCS

## 2023-05-04 PROCEDURE — G8427 DOCREV CUR MEDS BY ELIG CLIN: HCPCS

## 2023-05-04 PROCEDURE — 1090F PRES/ABSN URINE INCON ASSESS: CPT

## 2023-05-04 PROCEDURE — 1101F PT FALLS ASSESS-DOCD LE1/YR: CPT

## 2023-05-04 RX ORDER — NORTRIPTYLINE HYDROCHLORIDE 25 MG/1
25 CAPSULE ORAL
Qty: 90 CAPSULE | Refills: 0 | Status: SHIPPED | OUTPATIENT
Start: 2023-05-04

## 2023-05-12 ENCOUNTER — TRANSCRIBE ORDERS (OUTPATIENT)
Facility: HOSPITAL | Age: 83
End: 2023-05-12

## 2023-05-12 DIAGNOSIS — M54.81 OCCIPITAL NEURALGIA OF RIGHT SIDE: Primary | ICD-10-CM

## 2023-06-06 ENCOUNTER — HOSPITAL ENCOUNTER (OUTPATIENT)
Facility: HOSPITAL | Age: 83
Discharge: HOME | End: 2023-06-09
Payer: MEDICARE

## 2023-06-06 VITALS — WEIGHT: 157 LBS | HEIGHT: 64 IN | BODY MASS INDEX: 26.8 KG/M2

## 2023-06-06 DIAGNOSIS — M54.81 OCCIPITAL NEURALGIA OF RIGHT SIDE: ICD-10-CM

## 2023-06-06 PROCEDURE — 72141 MRI NECK SPINE W/O DYE: CPT

## 2023-07-19 ENCOUNTER — TELEPHONE (OUTPATIENT)
Age: 83
End: 2023-07-19

## 2023-07-19 NOTE — TELEPHONE ENCOUNTER
Patients daughter requesting a sooner appointment for test results. She stated patient will be out of town 7/24/23 and can be seen any day after that date. I informed her the next available appointment will not be until September, but she stated she can not wait that long. She would like nurse or doctor to contact her to get patient in sooner. Please contact.

## 2023-08-20 ENCOUNTER — APPOINTMENT (OUTPATIENT)
Facility: HOSPITAL | Age: 83
End: 2023-08-20
Payer: MEDICARE

## 2023-08-20 ENCOUNTER — HOSPITAL ENCOUNTER (EMERGENCY)
Facility: HOSPITAL | Age: 83
Discharge: HOME | End: 2023-08-20
Attending: EMERGENCY MEDICINE
Payer: MEDICARE

## 2023-08-20 VITALS
BODY MASS INDEX: 25.95 KG/M2 | OXYGEN SATURATION: 95 % | DIASTOLIC BLOOD PRESSURE: 76 MMHG | WEIGHT: 152 LBS | HEIGHT: 64 IN | RESPIRATION RATE: 16 BRPM | TEMPERATURE: 98 F | SYSTOLIC BLOOD PRESSURE: 140 MMHG | HEART RATE: 84 BPM

## 2023-08-20 DIAGNOSIS — N39.0 URINARY TRACT INFECTION WITHOUT HEMATURIA, SITE UNSPECIFIED: ICD-10-CM

## 2023-08-20 DIAGNOSIS — R53.83 OTHER FATIGUE: Primary | ICD-10-CM

## 2023-08-20 DIAGNOSIS — R30.0 DYSURIA: ICD-10-CM

## 2023-08-20 DIAGNOSIS — R21 RASH: ICD-10-CM

## 2023-08-20 LAB
APPEARANCE UR: CLEAR
BACTERIA URNS QL MICRO: ABNORMAL /HPF
BILIRUB UR QL: NEGATIVE
COLOR UR: ABNORMAL
EPITH CASTS URNS QL MICRO: ABNORMAL /LPF
GLUCOSE BLD STRIP.AUTO-MCNC: 158 MG/DL (ref 65–117)
GLUCOSE UR STRIP.AUTO-MCNC: NEGATIVE MG/DL
HGB UR QL STRIP: NEGATIVE
KETONES UR QL STRIP.AUTO: NEGATIVE MG/DL
LEUKOCYTE ESTERASE UR QL STRIP.AUTO: ABNORMAL
NITRITE UR QL STRIP.AUTO: NEGATIVE
PH UR STRIP: 6.5 (ref 5–8)
PROT UR STRIP-MCNC: NEGATIVE MG/DL
RBC #/AREA URNS HPF: ABNORMAL /HPF
SERVICE CMNT-IMP: ABNORMAL
SP GR UR REFRACTOMETRY: <1.005 (ref 1–1.03)
URINE CULTURE IF INDICATED: ABNORMAL
UROBILINOGEN UR QL STRIP.AUTO: 0.2 EU/DL (ref 0.2–1)
WBC URNS QL MICRO: ABNORMAL /HPF (ref 0–4)

## 2023-08-20 PROCEDURE — 82962 GLUCOSE BLOOD TEST: CPT

## 2023-08-20 PROCEDURE — 71045 X-RAY EXAM CHEST 1 VIEW: CPT

## 2023-08-20 PROCEDURE — 87077 CULTURE AEROBIC IDENTIFY: CPT

## 2023-08-20 PROCEDURE — 87186 SC STD MICRODIL/AGAR DIL: CPT

## 2023-08-20 PROCEDURE — 81001 URINALYSIS AUTO W/SCOPE: CPT

## 2023-08-20 PROCEDURE — 6370000000 HC RX 637 (ALT 250 FOR IP): Performed by: EMERGENCY MEDICINE

## 2023-08-20 PROCEDURE — 99284 EMERGENCY DEPT VISIT MOD MDM: CPT

## 2023-08-20 PROCEDURE — 87086 URINE CULTURE/COLONY COUNT: CPT

## 2023-08-20 RX ORDER — CEPHALEXIN 250 MG/1
500 CAPSULE ORAL
Status: COMPLETED | OUTPATIENT
Start: 2023-08-20 | End: 2023-08-20

## 2023-08-20 RX ORDER — 0.9 % SODIUM CHLORIDE 0.9 %
1000 INTRAVENOUS SOLUTION INTRAVENOUS ONCE
Status: DISCONTINUED | OUTPATIENT
Start: 2023-08-20 | End: 2023-08-20

## 2023-08-20 RX ORDER — CEPHALEXIN 500 MG/1
500 CAPSULE ORAL 2 TIMES DAILY
Qty: 14 CAPSULE | Refills: 0 | Status: SHIPPED | OUTPATIENT
Start: 2023-08-20 | End: 2023-08-27

## 2023-08-20 RX ORDER — METHYLPREDNISOLONE 4 MG/1
TABLET ORAL
Qty: 1 KIT | Refills: 0 | Status: SHIPPED | OUTPATIENT
Start: 2023-08-20 | End: 2023-08-26

## 2023-08-20 RX ORDER — PREDNISONE 20 MG/1
40 TABLET ORAL ONCE
Status: COMPLETED | OUTPATIENT
Start: 2023-08-20 | End: 2023-08-20

## 2023-08-20 RX ADMIN — CEPHALEXIN 500 MG: 250 CAPSULE ORAL at 21:51

## 2023-08-20 RX ADMIN — PREDNISONE 40 MG: 20 TABLET ORAL at 21:50

## 2023-08-20 ASSESSMENT — PAIN - FUNCTIONAL ASSESSMENT: PAIN_FUNCTIONAL_ASSESSMENT: 0-10

## 2023-08-20 ASSESSMENT — LIFESTYLE VARIABLES
HOW MANY STANDARD DRINKS CONTAINING ALCOHOL DO YOU HAVE ON A TYPICAL DAY: PATIENT DOES NOT DRINK
HOW OFTEN DO YOU HAVE A DRINK CONTAINING ALCOHOL: NEVER

## 2023-08-20 ASSESSMENT — PAIN SCALES - GENERAL: PAINLEVEL_OUTOF10: 3

## 2023-08-21 NOTE — ED NOTES
Pt arrives ambulatory to ED bed with c/o feeling nauseated for the past 2-3 days. She states she has also had dysuria for the past 2-3 days. She states a hx of frequent UTI. She denies any hematuria. She is AAO x 4 in NAD. Respirations regular/unlabored. Denies additional complaints.       Binh Marshall RN  08/20/23 2051

## 2023-08-21 NOTE — ED TRIAGE NOTES
Pt in ED with c/o nausea, fatigue, urinary frequency x 2-3 days. Pt also reports that she has been itching on and off for about a week now. Pt has frequent UTIs with the last one being treated with Macrobid about a month ago. Pt being followed by urology for recurrent UTIs.

## 2023-08-21 NOTE — ED PROVIDER NOTES
infection without hematuria, site unspecified          DISPOSITION/PLAN   DISPOSITION Decision To Discharge 08/20/2023 09:49:49 PM      PATIENT REFERRED TO:  Lg James MD  [address]  [phone]    Schedule an appointment as soon as possible for a visit         DISCHARGE MEDICATIONS:  Discharge Medication List as of 8/20/2023  9:50 PM        START taking these medications    Details   methylPREDNISolone (MEDROL, CHIRAG,) 4 MG tablet Take by mouth., Disp-1 kit, R-0Normal      cephALEXin (KEFLEX) 500 MG capsule Take 1 capsule by mouth 2 times daily for 7 days, Disp-14 capsule, R-0Normal               (Please note that portions of this note were completed with a voice recognition program.  Efforts were made to edit the dictations but occasionally words are mis-transcribed.)    Brandi Chambers MD (electronically signed)  Emergency Attending Physician            Brandi Chambers MD  08/20/23 9266

## 2023-08-23 LAB
BACTERIA SPEC CULT: ABNORMAL
CC UR VC: ABNORMAL
SERVICE CMNT-IMP: ABNORMAL

## 2023-11-09 ENCOUNTER — HOSPITAL ENCOUNTER (EMERGENCY)
Facility: HOSPITAL | Age: 83
Discharge: HOME | End: 2023-11-09
Attending: EMERGENCY MEDICINE
Payer: MEDICARE

## 2023-11-09 VITALS
BODY MASS INDEX: 25.95 KG/M2 | TEMPERATURE: 97.9 F | RESPIRATION RATE: 16 BRPM | HEART RATE: 65 BPM | OXYGEN SATURATION: 96 % | SYSTOLIC BLOOD PRESSURE: 140 MMHG | HEIGHT: 64 IN | DIASTOLIC BLOOD PRESSURE: 66 MMHG | WEIGHT: 152 LBS

## 2023-11-09 DIAGNOSIS — I10 PRIMARY HYPERTENSION: Primary | ICD-10-CM

## 2023-11-09 LAB
GLUCOSE BLD STRIP.AUTO-MCNC: 145 MG/DL (ref 65–117)
SERVICE CMNT-IMP: ABNORMAL

## 2023-11-09 PROCEDURE — 99283 EMERGENCY DEPT VISIT LOW MDM: CPT

## 2023-11-09 PROCEDURE — 6370000000 HC RX 637 (ALT 250 FOR IP): Performed by: STUDENT IN AN ORGANIZED HEALTH CARE EDUCATION/TRAINING PROGRAM

## 2023-11-09 PROCEDURE — 82962 GLUCOSE BLOOD TEST: CPT

## 2023-11-09 RX ORDER — ONDANSETRON 4 MG/1
4 TABLET, ORALLY DISINTEGRATING ORAL ONCE
Status: COMPLETED | OUTPATIENT
Start: 2023-11-09 | End: 2023-11-09

## 2023-11-09 RX ORDER — MELOXICAM 15 MG/1
TABLET ORAL
COMMUNITY

## 2023-11-09 RX ORDER — ICOSAPENT ETHYL 1000 MG/1
CAPSULE ORAL
COMMUNITY

## 2023-11-09 RX ORDER — CELECOXIB 100 MG/1
CAPSULE ORAL
COMMUNITY

## 2023-11-09 RX ORDER — ATENOLOL 50 MG/1
50 TABLET ORAL DAILY
COMMUNITY
Start: 2023-10-19

## 2023-11-09 RX ORDER — ESOMEPRAZOLE MAGNESIUM 40 MG/1
CAPSULE, DELAYED RELEASE ORAL
COMMUNITY
Start: 2023-10-19

## 2023-11-09 RX ORDER — ATORVASTATIN CALCIUM 40 MG/1
TABLET, FILM COATED ORAL
COMMUNITY

## 2023-11-09 RX ORDER — OXCARBAZEPINE 150 MG/1
TABLET, FILM COATED ORAL
COMMUNITY
Start: 2021-10-13

## 2023-11-09 RX ORDER — AMLODIPINE BESYLATE 5 MG/1
5 TABLET ORAL DAILY
COMMUNITY
Start: 2023-10-19

## 2023-11-09 RX ORDER — GABAPENTIN 300 MG/1
300 CAPSULE ORAL 2 TIMES DAILY
COMMUNITY
Start: 2023-10-25

## 2023-11-09 RX ORDER — SITAGLIPTIN 100 MG/1
TABLET, FILM COATED ORAL
COMMUNITY
Start: 2023-10-19

## 2023-11-09 RX ORDER — CLONAZEPAM 0.5 MG/1
TABLET ORAL
COMMUNITY
Start: 2019-02-27

## 2023-11-09 RX ADMIN — ONDANSETRON 4 MG: 4 TABLET, ORALLY DISINTEGRATING ORAL at 13:05

## 2023-11-09 ASSESSMENT — ENCOUNTER SYMPTOMS
COUGH: 0
SORE THROAT: 0
EYE PAIN: 0
SHORTNESS OF BREATH: 0
VOMITING: 0
DIARRHEA: 0
ABDOMINAL PAIN: 0
NAUSEA: 0

## 2023-11-09 ASSESSMENT — PAIN - FUNCTIONAL ASSESSMENT: PAIN_FUNCTIONAL_ASSESSMENT: NONE - DENIES PAIN

## 2023-11-09 NOTE — ED PROVIDER NOTES
portions of this note were completed with a voice recognition program.  Efforts were made to edit the dictations but occasionally words are mis-transcribed.)    ROSIBEL Thomas (electronically signed)  Emergency Attending Physician / Physician Assistant / Nurse Practitioner              Jenni Saldivar, [address] [place] [address]  11/09/23 6977

## 2023-11-09 NOTE — DISCHARGE INSTRUCTIONS
Continue to monitor symptoms at home. Take BP medication as prescribed. Follow-up with PCP and return with any changes or worsening.

## 2023-11-09 NOTE — ED TRIAGE NOTES
Pt states she checked her blood pressure this morning and it was 180/70. Pt also reports one episode of dizziness at home that lasted about 1 hour. Pt takes blood pressure medication regularly. Patient arrives to ED ambulatory w/o issue. No acute distress noted in triage. A&O x 4.

## 2023-11-09 NOTE — ED NOTES
Discharge instructions reviewed with patient and questions answered. Pt ambulatory out of department in no acute distress.        Shorty Kennedy RN  11/09/23 6304

## 2024-02-09 ENCOUNTER — OFFICE VISIT (OUTPATIENT)
Age: 84
End: 2024-02-09
Payer: MEDICARE

## 2024-02-09 VITALS — RESPIRATION RATE: 20 BRPM | DIASTOLIC BLOOD PRESSURE: 84 MMHG | SYSTOLIC BLOOD PRESSURE: 136 MMHG

## 2024-02-09 DIAGNOSIS — M54.81 OCCIPITAL NEURALGIA OF RIGHT SIDE: Primary | ICD-10-CM

## 2024-02-09 PROCEDURE — G8419 CALC BMI OUT NRM PARAM NOF/U: HCPCS

## 2024-02-09 PROCEDURE — 1123F ACP DISCUSS/DSCN MKR DOCD: CPT

## 2024-02-09 PROCEDURE — 1090F PRES/ABSN URINE INCON ASSESS: CPT

## 2024-02-09 PROCEDURE — G8400 PT W/DXA NO RESULTS DOC: HCPCS

## 2024-02-09 PROCEDURE — 99213 OFFICE O/P EST LOW 20 MIN: CPT

## 2024-02-09 PROCEDURE — 1036F TOBACCO NON-USER: CPT

## 2024-02-09 PROCEDURE — G8484 FLU IMMUNIZE NO ADMIN: HCPCS

## 2024-02-09 PROCEDURE — G8427 DOCREV CUR MEDS BY ELIG CLIN: HCPCS

## 2024-02-09 NOTE — PROGRESS NOTES
Test Results:     MRI     
(VASCEPA) 1 g CAPS capsule       meloxicam (MOBIC) 15 MG tablet       OXcarbazepine (TRILEPTAL) 150 MG tablet       JANUVIA 100 MG tablet        No current facility-administered medications for this visit.        Social History     Tobacco Use   Smoking Status Never   Smokeless Tobacco Never       Past Medical History:   Diagnosis Date    Diabetes mellitus (HCC)     Hypertension        No past surgical history on file.    No family history on file.    Social History     Socioeconomic History    Marital status:    Tobacco Use    Smoking status: Never    Smokeless tobacco: Never   Substance and Sexual Activity    Alcohol use: Not Currently       Review of Systems   Musculoskeletal:  Positive for neck pain.   Neurological:  Positive for headaches.         Remainder of comprehensive review is negative.     Physical Exam :    /84 (Site: Right Upper Arm, Position: Sitting, Cuff Size: Large Adult)   Resp 20     General: Well defined, nourished, and groomed individual in no acute distress.    Neck: Supple, nontender, no bruits, no pain with resistance to active range of motion.    Musculoskeletal: Extremities revealed no edema and had full range of motion of joints.    Psych: Good mood and bright affect        Orders Placed This Encounter    External Referral To Pain Clinic     Referral Priority:   Routine     Referral Type:   Eval and Treat     Referral Reason:   Specialty Services Required     Referred to Provider:   Derik Louise MD     Requested Specialty:   Pain Management     Number of Visits Requested:   1        1. Occipital neuralgia of right side      Refer the patient to Dr. Louise for pain management.    Patient may follow-up with neurology on an as-needed basis going forward

## 2024-04-08 ENCOUNTER — APPOINTMENT (OUTPATIENT)
Facility: HOSPITAL | Age: 84
End: 2024-04-08
Payer: MEDICARE

## 2024-04-08 ENCOUNTER — HOSPITAL ENCOUNTER (EMERGENCY)
Facility: HOSPITAL | Age: 84
Discharge: HOME | End: 2024-04-08
Attending: STUDENT IN AN ORGANIZED HEALTH CARE EDUCATION/TRAINING PROGRAM
Payer: MEDICARE

## 2024-04-08 VITALS
HEART RATE: 74 BPM | SYSTOLIC BLOOD PRESSURE: 127 MMHG | OXYGEN SATURATION: 96 % | RESPIRATION RATE: 18 BRPM | TEMPERATURE: 97.8 F | DIASTOLIC BLOOD PRESSURE: 75 MMHG | BODY MASS INDEX: 25.61 KG/M2 | WEIGHT: 150 LBS | HEIGHT: 64 IN

## 2024-04-08 DIAGNOSIS — R42 DIZZINESS: Primary | ICD-10-CM

## 2024-04-08 DIAGNOSIS — N30.00 ACUTE CYSTITIS WITHOUT HEMATURIA: ICD-10-CM

## 2024-04-08 LAB
ALBUMIN SERPL-MCNC: 4.6 G/DL (ref 3.5–5.2)
ALBUMIN/GLOB SERPL: 1.4 (ref 1.1–2.2)
ALP SERPL-CCNC: 76 U/L (ref 35–104)
ALT SERPL-CCNC: 38 U/L (ref 10–35)
ANION GAP SERPL CALC-SCNC: 13 MMOL/L (ref 5–15)
APPEARANCE UR: CLEAR
AST SERPL-CCNC: 39 U/L (ref 10–35)
BACTERIA URNS QL MICRO: ABNORMAL /HPF
BASOPHILS # BLD: 0.1 K/UL (ref 0–1)
BASOPHILS NFR BLD: 1 % (ref 0–1)
BILIRUB SERPL-MCNC: 1 MG/DL (ref 0.2–1)
BILIRUB UR QL: NEGATIVE
BUN SERPL-MCNC: 13 MG/DL (ref 8–23)
BUN/CREAT SERPL: 19 (ref 12–20)
CALCIUM SERPL-MCNC: 9.7 MG/DL (ref 8.8–10.2)
CHLORIDE SERPL-SCNC: 101 MMOL/L (ref 98–107)
CO2 SERPL-SCNC: 27 MMOL/L (ref 22–29)
COLOR UR: ABNORMAL
CREAT SERPL-MCNC: 0.67 MG/DL (ref 0.5–0.9)
DIFFERENTIAL METHOD BLD: ABNORMAL
EOSINOPHIL # BLD: 0.1 K/UL (ref 0–0.4)
EOSINOPHIL NFR BLD: 1 %
EPITH CASTS URNS QL MICRO: ABNORMAL /LPF
ERYTHROCYTE [DISTWIDTH] IN BLOOD BY AUTOMATED COUNT: 13.1 % (ref 11.5–14.5)
GLOBULIN SER CALC-MCNC: 3.4 G/DL (ref 2–4)
GLUCOSE SERPL-MCNC: 233 MG/DL (ref 65–100)
GLUCOSE UR STRIP.AUTO-MCNC: NEGATIVE MG/DL
HCT VFR BLD AUTO: 39.9 % (ref 35–47)
HGB BLD-MCNC: 13.9 G/DL (ref 11.5–16)
HGB UR QL STRIP: NEGATIVE
IMM GRANULOCYTES # BLD AUTO: 0 K/UL (ref 0–0.04)
IMM GRANULOCYTES NFR BLD AUTO: 1 % (ref 0–0.5)
KETONES UR QL STRIP.AUTO: NEGATIVE MG/DL
LEUKOCYTE ESTERASE UR QL STRIP.AUTO: ABNORMAL
LYMPHOCYTES # BLD: 2.4 K/UL (ref 0.8–3.5)
LYMPHOCYTES NFR BLD: 29 % (ref 12–49)
MAGNESIUM SERPL-MCNC: 1.7 MG/DL (ref 1.6–2.4)
MCH RBC QN AUTO: 32 PG (ref 26–34)
MCHC RBC AUTO-ENTMCNC: 34.8 G/DL (ref 30–36.5)
MCV RBC AUTO: 91.7 FL (ref 80–99)
MONOCYTES # BLD: 0.5 K/UL (ref 0–1)
MONOCYTES NFR BLD: 6 % (ref 5–13)
NEUTS SEG # BLD: 5.1 K/UL (ref 1.8–8)
NEUTS SEG NFR BLD: 62 % (ref 32–75)
NITRITE UR QL STRIP.AUTO: POSITIVE
NRBC # BLD: 0 K/UL (ref 0–0.01)
NRBC BLD-RTO: 0 PER 100 WBC
PH UR STRIP: 6 (ref 5–8)
PLATELET # BLD AUTO: 189 K/UL (ref 150–400)
PMV BLD AUTO: 11 FL (ref 8.9–12.9)
POTASSIUM SERPL-SCNC: 3.8 MMOL/L (ref 3.5–5.1)
PROT SERPL-MCNC: 8 G/DL (ref 6.4–8.3)
PROT UR STRIP-MCNC: ABNORMAL MG/DL
RBC # BLD AUTO: 4.35 M/UL (ref 3.8–5.2)
RBC #/AREA URNS HPF: ABNORMAL /HPF
SODIUM SERPL-SCNC: 141 MMOL/L (ref 136–145)
SP GR UR REFRACTOMETRY: 1.01 (ref 1–1.03)
SPECIMEN HOLD: NORMAL
TROPONIN T SERPL HS-MCNC: 13.9 NG/L (ref 0–14)
UROBILINOGEN UR QL STRIP.AUTO: 1 EU/DL (ref 0.2–1)
WBC # BLD AUTO: 8.1 K/UL (ref 3.6–11)
WBC URNS QL MICRO: ABNORMAL /HPF (ref 0–4)

## 2024-04-08 PROCEDURE — 93005 ELECTROCARDIOGRAM TRACING: CPT | Performed by: STUDENT IN AN ORGANIZED HEALTH CARE EDUCATION/TRAINING PROGRAM

## 2024-04-08 PROCEDURE — 36415 COLL VENOUS BLD VENIPUNCTURE: CPT

## 2024-04-08 PROCEDURE — 81001 URINALYSIS AUTO W/SCOPE: CPT

## 2024-04-08 PROCEDURE — 70450 CT HEAD/BRAIN W/O DYE: CPT

## 2024-04-08 PROCEDURE — 84484 ASSAY OF TROPONIN QUANT: CPT

## 2024-04-08 PROCEDURE — 85025 COMPLETE CBC W/AUTO DIFF WBC: CPT

## 2024-04-08 PROCEDURE — 80053 COMPREHEN METABOLIC PANEL: CPT

## 2024-04-08 PROCEDURE — 99284 EMERGENCY DEPT VISIT MOD MDM: CPT

## 2024-04-08 PROCEDURE — 83735 ASSAY OF MAGNESIUM: CPT

## 2024-04-08 RX ORDER — CEPHALEXIN 500 MG/1
500 CAPSULE ORAL 4 TIMES DAILY
Qty: 28 CAPSULE | Refills: 0 | Status: SHIPPED | OUTPATIENT
Start: 2024-04-08 | End: 2024-04-15

## 2024-04-08 ASSESSMENT — PAIN - FUNCTIONAL ASSESSMENT: PAIN_FUNCTIONAL_ASSESSMENT: 0-10

## 2024-04-08 ASSESSMENT — PAIN DESCRIPTION - LOCATION: LOCATION: HEAD

## 2024-04-08 ASSESSMENT — PAIN SCALES - GENERAL: PAINLEVEL_OUTOF10: 7

## 2024-04-08 NOTE — ED TRIAGE NOTES
PT ambulatory to ED with complaints of not being able to hold urine for a couple days. PT took phenazopyridine 200mg. PT also reports she started feeling dizzy after doing some house chores. PT reports hypertension 175/80 and BS was 160 today.

## 2024-04-08 NOTE — ED PROVIDER NOTES
[facility]  EMERGENCY DEPARTMENT ENCOUNTER      Pt Name: Summer Wilson  MRN: [id number]  Birthdate 1940  Date of evaluation: 4/8/2024  Provider: Vernon Leahy DO    CHIEF COMPLAINT       Chief Complaint   Patient presents with    Urinary Frequency    Dizziness    Hypertension    Hyperglycemia         HISTORY OF PRESENT ILLNESS   (Location/Symptom, Timing/Onset, Context/Setting, Quality, Duration, Modifying Factors, Severity)  Note limiting factors.   The patient is an 83-year-old female history of hypertension and diabetes presenting today with urinary frequency/dysuria in addition to some dizziness.  Patient states that for the past day or 2 she has had trouble holding her urine and has had frequency/urgency and dysuria.  Similar to prior UTIs.  She has not had fever.  She also reports that today around noon she had a few minute episode of dizziness while performing some chores at home.  It was a spinning dizziness.  No associated focal weakness or numbness.  She does have a headache.  She is not currently dizzy.  She is not on blood thinners .  She took her blood pressure and blood sugar and both were elevated.  She has been compliant with her medications.  Upon arrival here no chest pain, shortness of breath or active dizziness.            Review of External Medical Records:     Nursing Notes were reviewed.    REVIEW OF SYSTEMS    (2-9 systems for level 4, 10 or more for level 5)     Review of Systems   Genitourinary:  Positive for dysuria and frequency.   Neurological:  Positive for dizziness.       Except as noted above the remainder of the review of systems was reviewed and negative.       PAST MEDICAL HISTORY     Past Medical History:   Diagnosis Date    Diabetes mellitus (HCC)     Hypertension          SURGICAL HISTORY     History reviewed. No pertinent surgical history.      CURRENT MEDICATIONS       Discharge Medication List as of 4/8/2024  5:19 PM        CONTINUE these

## 2024-04-09 LAB
EKG ATRIAL RATE: 71 BPM
EKG DIAGNOSIS: NORMAL
EKG P AXIS: 51 DEGREES
EKG P-R INTERVAL: 226 MS
EKG Q-T INTERVAL: 416 MS
EKG QRS DURATION: 68 MS
EKG QTC CALCULATION (BAZETT): 452 MS
EKG R AXIS: 32 DEGREES
EKG T AXIS: 51 DEGREES
EKG VENTRICULAR RATE: 71 BPM

## 2024-04-09 PROCEDURE — 93010 ELECTROCARDIOGRAM REPORT: CPT | Performed by: INTERNAL MEDICINE

## 2025-02-12 ENCOUNTER — TELEPHONE (OUTPATIENT)
Age: 85
End: 2025-02-12

## 2025-02-13 NOTE — TELEPHONE ENCOUNTER
Reached back out to the patients daughter and schedule a follow up with Dr. Chase at  on May 27th.